# Patient Record
Sex: MALE | Race: WHITE | NOT HISPANIC OR LATINO | Employment: OTHER | URBAN - METROPOLITAN AREA
[De-identification: names, ages, dates, MRNs, and addresses within clinical notes are randomized per-mention and may not be internally consistent; named-entity substitution may affect disease eponyms.]

---

## 2024-11-25 ENCOUNTER — TELEPHONE (OUTPATIENT)
Dept: ORTHOPEDICS | Facility: CLINIC | Age: 59
End: 2024-11-25
Payer: COMMERCIAL

## 2024-11-25 NOTE — TELEPHONE ENCOUNTER
----- Message from Karina sent at 11/6/2024  3:49 PM CST -----  Contact: Loulou/wife  Type:  Sooner Apoointment Request    Caller is requesting a sooner appointment.  Caller declined first available appointment listed below.  Caller will not accept being placed on the waitlist and is requesting a message be sent to doctor.  Name of Caller:Loulou(wife)  When is the first available appointment? December/11th/2024 for [facility] and December/13th/2024 for the [facility]  Symptoms: left shoulder pain  Would the patient rather a call back or a response via [facility]? Call back  Best Call Back Number:[phone]    Additional Information[place] [facility] [place]

## 2025-03-19 ENCOUNTER — OFFICE VISIT (OUTPATIENT)
Dept: ORTHOPEDICS | Facility: CLINIC | Age: 60
End: 2025-03-19
Payer: COMMERCIAL

## 2025-03-19 VITALS — BODY MASS INDEX: 33.75 KG/M2 | HEIGHT: 66 IN | WEIGHT: 210 LBS

## 2025-03-19 DIAGNOSIS — M19.012 PRIMARY OSTEOARTHRITIS OF LEFT SHOULDER: ICD-10-CM

## 2025-03-19 DIAGNOSIS — M17.12 OSTEOARTHRITIS OF LEFT KNEE, UNSPECIFIED OSTEOARTHRITIS TYPE: Primary | ICD-10-CM

## 2025-03-19 DIAGNOSIS — M25.519 SHOULDER PAIN, UNSPECIFIED CHRONICITY, UNSPECIFIED LATERALITY: Primary | ICD-10-CM

## 2025-03-19 DIAGNOSIS — M17.12 PRIMARY OSTEOARTHRITIS OF LEFT KNEE: Primary | ICD-10-CM

## 2025-03-19 PROCEDURE — G2211 COMPLEX E/M VISIT ADD ON: HCPCS | Mod: S$GLB,,, | Performed by: ORTHOPAEDIC SURGERY

## 2025-03-19 PROCEDURE — 1160F RVW MEDS BY RX/DR IN RCRD: CPT | Mod: CPTII,S$GLB,, | Performed by: ORTHOPAEDIC SURGERY

## 2025-03-19 PROCEDURE — 3008F BODY MASS INDEX DOCD: CPT | Mod: CPTII,S$GLB,, | Performed by: ORTHOPAEDIC SURGERY

## 2025-03-19 PROCEDURE — 99999 PR PBB SHADOW E&M-EST. PATIENT-LVL III: CPT | Mod: PBBFAC,,, | Performed by: ORTHOPAEDIC SURGERY

## 2025-03-19 PROCEDURE — 99213 OFFICE O/P EST LOW 20 MIN: CPT | Mod: S$GLB,,, | Performed by: ORTHOPAEDIC SURGERY

## 2025-03-19 PROCEDURE — 1159F MED LIST DOCD IN RCRD: CPT | Mod: CPTII,S$GLB,, | Performed by: ORTHOPAEDIC SURGERY

## 2025-03-19 RX ORDER — AMLODIPINE BESYLATE 2.5 MG/1
2.5 TABLET ORAL
COMMUNITY

## 2025-03-19 RX ORDER — CLONAZEPAM 0.5 MG/1
1 TABLET ORAL NIGHTLY PRN
COMMUNITY
Start: 2025-01-29

## 2025-03-19 NOTE — PATIENT INSTRUCTIONS
Encounter Diagnoses   Name Primary?    Primary osteoarthritis of left knee Yes    Primary osteoarthritis of left shoulder        ASSESSMENT:  Left shoulder with 10/10 pain nonresponsive to physical therapy November and December 20, 2024.  No significant improvement with steroidal injection.  Left knee with a improvement status post Euflexxa October 9, 2024.  No previous left shoulder surgeries.  Status post left ACL reconstruction 40+ years ago    TREATMENT/PLAN:  Proceed to MRI for the left shoulder.  He has not responded to conservative treatment including physical therapy.  He has constant pain day and night.  Rule out rotator cuff tear labral tear and evaluate extent of osteoarthritis of the left shoulder joint.  Return to office after MRI for further evaluation and consultation regarding treatment options for left shoulder chronic pain.  Order viscosupplementation 3 injection series for the left knee to begin after April 9, 2025.  The patient got good pain reduction and increased activity of daily living in October after conclusion of Euflexxa Visco supplement injection series.

## 2025-03-19 NOTE — PROGRESS NOTES
Emre Purdy MD  Orthopedic Surgeon   [address] [place] [address]                 Name: Terrance Johnson  MRN: [id number]  Date: 3/19/2025    Patient ID: Terrance Johnson is a 60 y.o. male from  [facility]    Reason for visit:  Pain 10/10, left shoulder  Follow up left knee arthritis moderate pain    Patient Instructions     Encounter Diagnoses   Name Primary?    Primary osteoarthritis of left knee Yes    Primary osteoarthritis of left shoulder        ASSESSMENT:  Left shoulder with 10/10 pain nonresponsive to physical therapy November and December 20, 2024.  No significant improvement with steroidal injection.  Left knee with a improvement status post Euflexxa October 9, 2024.  No previous left shoulder surgeries.  Status post left ACL reconstruction 40+ years ago    TREATMENT/PLAN:  Proceed to MRI for the left shoulder.  He has not responded to conservative treatment including physical therapy.  He has constant pain day and night.  Rule out rotator cuff tear labral tear and evaluate extent of osteoarthritis of the left shoulder joint.  Return to office after MRI for further evaluation and consultation regarding treatment options for left shoulder chronic pain.  Order viscosupplementation 3 injection series for the left knee to begin after April 9, 2025.  The patient got good pain reduction and increased activity of daily living in October after conclusion of Euflexxa Visco supplement injection series.    HISTORY OF PRESENT ILLNESS:   Terrance Johnson is a 60 y.o. male.Patient presents today with high level pain of the left shoulder 10/10  He got no relief with physical therapy that he participated in November and December of 2024.  Intra-articular injection the shoulder performed by me October 9, 2024  resulted in no significant pain improvement of the left shoulder.    Left knee got better after Euflexxa injection series completed October 9, 2024.    He had a left ACL reconstruction 40+ years ago by Dr. Baker.    He has never had left shoulder surgery.  He states he worked as a  for many years carried heavy objects and has worn his shoulder out.  Left shoulder pain is with overhead activities lifting and carrying he has constant popping in the shoulder.  She states the pain in the shoulders constant day and night.  Having difficulty sleeping.    Objective     XRAY:  None today      PHYSICAL EXAMINATION: Body mass index is 33.89 kg/m².    GENERAL:  Pleasant cooperative alert  gentleman in no acute distress.  Well dressed and well groomed.    EXTREMITY:  Left shoulder has positive popping with circumduction at about 30° abduction.  AC joint nontender.  Pain with resisted abduction with weakness on abduction.  Internal rotation is diminished active and passive range of motion left shoulder.  Neck supple free range of motion with no Spurling's positivity    Left knee has well-healed anteromedial incision from ACL reconstruction.  Anterior drawer has a good firm endpoint +1- 2 mm  No significant effusion of the left knee joint.  Positive pain at the medial joint line.  Pain is increased with flexion rotation and varus stress along the medial joint line.  Left knee is without significant popping or crepitation.  Quadriceps strength 5/5 left leg.  Skin temperature tone normal left lower extremity.  No peripheral edema.  Distal pulses and cap refill normal in the left leg         MEDICATIONS: Current Medications[1]    ALLERGIES: Review of patient's allergies indicates:  No Known Allergies    MEDICAL HISTORY:   Past Medical History:   Diagnosis Date    Hypertension        SURGICAL HISTORY:   Past Surgical History:   Procedure Laterality Date    acl Left     TONSILLECTOMY         REVIEW OF  SYSTEMS:   No fevers, chills, sweats, chest pain or shortness of breath.    SOCIAL HISTORY:   Social History     Occupational History    Not on file   Tobacco Use    Smoking status: Never    Smokeless tobacco: Never   Substance and Sexual Activity    Alcohol use: Not Currently    Drug use: Not on file    Sexual activity: Not on file       Patient Type: Established Patient  Visit Type: (CPT 77180 - Estab 20-29 min)     25 minute patient encounter  This includes face to face time and non-face to face time preparing to see the patient (eg, review of tests),   obtaining and/or reviewing separately obtained history, documenting clinical information in the electronic or other health record, independently interpreting results   and communicating results to the patient/family/caregiver, or care coordinator.       This patient has a chronic osteoarthritis diagnosis/condition.  Today's visit is associated with current or anticipated ongoing medical care related to this patient's diagnosis of osteoarthritis.   Currently there is no cure of osteoarthritis and the patient will require regular follow up to manage symptoms and progression.  I, Emre Purdy M.D., will be the primary medical provider for ongoing episodic treatment every 3-6 months as needed.  As such, CPT code  is the appropriate add-on code to accompany the other E/M billing for this visit.       DISCLAIMER: This note was prepared with [employer] voice recognition transcription software. Garbled syntax, mangled pronouns, and other bizarre constructions may be attributed to that software system.      Emre Purdy M.D.  Orthopedic Surgeon  [facility]           [1]   Current Outpatient Medications:     clonazePAM (KLONOPIN) 0.5 MG tablet, Take 1 tablet by mouth nightly as needed., Disp: , Rfl:     amLODIPine (NORVASC) 2.5 MG tablet, Take 2.5 mg by mouth., Disp: , Rfl:

## 2025-03-20 ENCOUNTER — TELEPHONE (OUTPATIENT)
Dept: ORTHOPEDICS | Facility: CLINIC | Age: 60
End: 2025-03-20
Payer: COMMERCIAL

## 2025-03-20 NOTE — TELEPHONE ENCOUNTER
Spoke with patient in regard to getting appt to discuss Mri at the [place] location    ----- Message from Stefania sent at 3/20/2025  9:19 AM CDT -----  Contact: pt wife  Type:  Needs Medical AdviceWho Called: Loulou Would the patient rather a call back or a response via [facility]? Call Best Call Back Number:  [phone] Additional Information:  Loulou states the pt has his MRI scheduled for next Tuesday 3/25.

## 2025-03-25 ENCOUNTER — HOSPITAL ENCOUNTER (OUTPATIENT)
Dept: RADIOLOGY | Facility: HOSPITAL | Age: 60
Discharge: HOME | End: 2025-03-25
Attending: ORTHOPAEDIC SURGERY
Payer: COMMERCIAL

## 2025-03-25 DIAGNOSIS — M25.519 SHOULDER PAIN, UNSPECIFIED CHRONICITY, UNSPECIFIED LATERALITY: ICD-10-CM

## 2025-03-25 PROCEDURE — 73221 MRI JOINT UPR EXTREM W/O DYE: CPT | Mod: 26,LT,, | Performed by: RADIOLOGY

## 2025-03-25 PROCEDURE — 73221 MRI JOINT UPR EXTREM W/O DYE: CPT | Mod: TC,PN,LT

## 2025-03-28 ENCOUNTER — OFFICE VISIT (OUTPATIENT)
Dept: ORTHOPEDICS | Facility: CLINIC | Age: 60
End: 2025-03-28
Payer: COMMERCIAL

## 2025-03-28 VITALS — HEIGHT: 66 IN | BODY MASS INDEX: 33.77 KG/M2 | WEIGHT: 210.13 LBS

## 2025-03-28 DIAGNOSIS — M24.112 LABRAL TEAR OF SHOULDER, DEGENERATIVE, LEFT: Primary | ICD-10-CM

## 2025-03-28 DIAGNOSIS — M19.012 ARTHRITIS OF LEFT ACROMIOCLAVICULAR JOINT: ICD-10-CM

## 2025-03-28 DIAGNOSIS — M25.819 SHOULDER IMPINGEMENT: ICD-10-CM

## 2025-03-28 DIAGNOSIS — M19.012 PRIMARY OSTEOARTHRITIS OF LEFT SHOULDER: ICD-10-CM

## 2025-03-28 DIAGNOSIS — M75.82 ROTATOR CUFF TENDONITIS, LEFT: ICD-10-CM

## 2025-03-28 DIAGNOSIS — M25.519 SHOULDER PAIN, UNSPECIFIED CHRONICITY, UNSPECIFIED LATERALITY: ICD-10-CM

## 2025-03-28 PROCEDURE — 99999 PR PBB SHADOW E&M-EST. PATIENT-LVL III: CPT | Mod: PBBFAC,,, | Performed by: ORTHOPAEDIC SURGERY

## 2025-03-28 NOTE — PATIENT INSTRUCTIONS
Encounter Diagnoses   Name Primary?    Shoulder pain, unspecified chronicity, unspecified laterality Yes    Primary osteoarthritis of left shoulder     Labral tear of shoulder, degenerative, left     Arthritis of left acromioclavicular joint     Rotator cuff tendonitis, left     Shoulder impingement        ASSESSMENT:  Impingement syndrome of the left shoulder not responding to conservative treatment including home exercise protocol anti-inflammatories and activity modification.  MRI demonstrates multiple abnormalities within the shoulder inclusive of OA of the glenohumeral joint, AC joint and degenerative labral tear  Supraspinatus and infraspinatus tendinosis with no rotator cuff tear    TREATMENT/PLAN:  The patient is having high level pain with decreased functional activities.  He is a candidate for consideration of arthroscopic shoulder subacromial decompression etc, and labral debridement.  I recommend he see Dr. Vaughn English for surgical consultation

## 2025-03-28 NOTE — PROGRESS NOTES
Emre Purdy MD  Orthopedic Surgeon   [address] [place] [address]                 Name: Terrance Johnson  MRN: [id number]  Date: 3/28/2025    Patient ID: Terrance Johnson is a 60 y.o. male from [facility]    Reason for visit:  Follow up left shoulder post MRI    Patient Instructions     Encounter Diagnoses   Name Primary?    Shoulder pain, unspecified chronicity, unspecified laterality Yes    Primary osteoarthritis of left shoulder     Labral tear of shoulder, degenerative, left     Arthritis of left acromioclavicular joint     Rotator cuff tendonitis, left     Shoulder impingement        ASSESSMENT:  Impingement syndrome of the left shoulder not responding to conservative treatment including home exercise protocol anti-inflammatories and activity modification.  MRI demonstrates multiple abnormalities within the shoulder inclusive of OA of the glenohumeral joint, AC joint and degenerative labral tear  Supraspinatus and infraspinatus tendinosis with no rotator cuff tear    TREATMENT/PLAN:  The patient is having high level pain with decreased functional activities.  He is a candidate for consideration of arthroscopic shoulder subacromial decompression etc, and labral debridement.  I recommend he see Dr. Vaughn English for surgical consultation     HISTORY OF PRESENT ILLNESS:   Terrance Johnson is a 60 y.o. male.Patient presents today for left shoulder chronic pain 7/10  MRI performed 03/25/2025 at [facility]  Supraspinatus and infraspinatus tendinosis with minimal intrasubstance delamination.     Severe osteoarthritis AC joint.     Severe glenohumeral osteoarthritis with circumferential degenerative tearing of the labrum.     Small joint effusion with synovitis and/or intra-articular loose bodies.      Finalized on: 3/25/2025 3:10 PM By:  Fede Aranda MD    Objective       PHYSICAL EXAMINATION: Body mass index is 33.91 kg/m².    GENERAL:  Pleasant cooperative alert  male in no acute distress well dressed and well groomed    EXTREMITY:  The patient has positive abduction to 60° and forward flexion to 95°.  Assistive abduction and forward flexion can be 120° with his other arm.  Internal rotation limited on the left to iliac crest.  Pain with impingement maneuver.  Negative speed's test.  Giving way to pain on resisted abduction and forward flexion.  Neck supple free range of motion with negative Spurling's maneuver.  Normal pulses and cap refill of the left upper extremity.  Normal elbow range of motion normal wrist and hand range of motion.  Skin temperature tone is normal in the left upper extremity.         MEDICATIONS: Current Medications[1]    ALLERGIES: Review of patient's allergies indicates:  No Known Allergies    MEDICAL HISTORY:   Past Medical History:   Diagnosis Date    Hypertension        SURGICAL HISTORY:   Past Surgical History:   Procedure Laterality Date    acl Left     TONSILLECTOMY         REVIEW OF SYSTEMS:   No fevers, chills, sweats, chest pain or shortness of breath.    SOCIAL HISTORY:   Social History     Occupational History    Not on file   Tobacco Use    Smoking status: Never    Smokeless tobacco: Never   Substance and Sexual Activity    Alcohol use: Not Currently    Drug use: Not on file    Sexual activity: Not on file       Patient Type: Established Patient  Visit Type: (CPT 70624 - Estab 20-29 min)     Twenty minute patient encounter  This includes face to face time and non-face to face time preparing to see the patient (eg, review of tests),   obtaining and/or reviewing separately obtained history, documenting clinical information in the electronic or other health record, independently interpreting results   and communicating results to the patient/family/caregiver,  or care coordinator.       DISCLAIMER: This note was prepared with [employer] voice recognition transcription software. Garbled syntax, mangled pronouns, and other bizarre constructions may be attributed to that software system.      Emre Purdy M.D.  Orthopedic Surgeon  [facility]           [1]   Current Outpatient Medications:     amLODIPine (NORVASC) 2.5 MG tablet, Take 2.5 mg by mouth., Disp: , Rfl:     clonazePAM (KLONOPIN) 0.5 MG tablet, Take 1 tablet by mouth nightly as needed., Disp: , Rfl:

## 2025-04-01 ENCOUNTER — OFFICE VISIT (OUTPATIENT)
Dept: SPORTS MEDICINE | Facility: CLINIC | Age: 60
End: 2025-04-01
Payer: COMMERCIAL

## 2025-04-01 ENCOUNTER — HOSPITAL ENCOUNTER (OUTPATIENT)
Dept: CARDIOLOGY | Facility: HOSPITAL | Age: 60
Discharge: HOME | End: 2025-04-01
Attending: STUDENT IN AN ORGANIZED HEALTH CARE EDUCATION/TRAINING PROGRAM
Payer: COMMERCIAL

## 2025-04-01 ENCOUNTER — TELEPHONE (OUTPATIENT)
Dept: SPORTS MEDICINE | Facility: CLINIC | Age: 60
End: 2025-04-01

## 2025-04-01 DIAGNOSIS — Z01.818 PREOPERATIVE CLEARANCE: ICD-10-CM

## 2025-04-01 DIAGNOSIS — M19.012 GLENOHUMERAL ARTHRITIS, LEFT: Primary | ICD-10-CM

## 2025-04-01 LAB
OHS QRS DURATION: 80 MS
OHS QTC CALCULATION: 402 MS

## 2025-04-01 PROCEDURE — 93010 ELECTROCARDIOGRAM REPORT: CPT | Mod: ,,, | Performed by: INTERNAL MEDICINE

## 2025-04-01 PROCEDURE — 93005 ELECTROCARDIOGRAM TRACING: CPT

## 2025-04-01 PROCEDURE — 99999 PR PBB SHADOW E&M-EST. PATIENT-LVL IV: CPT | Mod: PBBFAC,,, | Performed by: STUDENT IN AN ORGANIZED HEALTH CARE EDUCATION/TRAINING PROGRAM

## 2025-04-01 NOTE — H&P (VIEW-ONLY)
Orthopaedics Sports Medicine     Shoulder Initial Visit         4/1/2025    Referring MD: Emre Purdy MD    Chief Complaint   Patient presents with    Left Shoulder - Pain         History of Present Illness:   Terrance Johnson is a 60 y.o. right-hand dominant male who presents with left shoulder pain and dysfunction.    Onset of the symptoms was approximately 10 years ago.       Inciting event: No specific injury or trauma, gradual onset and worsening of symptoms.      Current symptoms include left shoulder pain mostly localized to the anterior and lateral shoulder. He reports constant aching, sharp, and shooting pain. He rates his pain a 7/10 today. The patients reports difficulty with certain movements like external rotation and lifting his arm out to the side. He reports he is very active with work and his pain limits his function. He reports it has started to affect him at night and reports difficulty with sleep due to the pain.  He reports crepitus with range of motion. He denies any shoulder instability, neck pain, or radicular symptoms.     Pain is aggravated by overhead movement.       Evaluation to date: X-Ray, MRI     Treatment to date: He has been treated by external providers but reports he has tried rest, activity modification, Tylenol, anti-inflammatories, multiple corticosteroid injections, and physical therapy.      Past Medical History:   Past Medical History:   Diagnosis Date    Hypertension        Past Surgical History:   Past Surgical History:   Procedure Laterality Date    acl Left     TONSILLECTOMY         Medications:  Patient's Medications   New Prescriptions    No medications on file   Previous Medications    AMLODIPINE (NORVASC) 2.5 MG TABLET    Take 2.5 mg by mouth.    CLONAZEPAM (KLONOPIN) 0.5 MG TABLET    Take 1 tablet by mouth nightly as needed.   Modified Medications    No medications on file   Discontinued Medications    No medications on file       Allergies: Review of patient's  "allergies indicates:  No Known Allergies    Social History:   Home town: [place], [place]  Occupation: Business Owner  Alcohol use: He reports that he does not currently use alcohol.  Tobacco use: He reports that he has never smoked. He has never used smokeless tobacco.    Review of systems:  History of recent illness, fevers, shakes, or chills: no  History of cardiac problems or chest pain: no  History of pulmonary problems or asthma: no  History of diabetes: no  History of prior dvt or clotting problems: no  History of sleep apnea: no      Physical Examination:  Estimated body mass index is 33.91 kg/m² as calculated from the following:    Height as of 3/28/25: 5' 6" (1.676 m).    Weight as of 3/28/25: 95.3 kg (210 lb 1.6 oz).    General  Healthy appearing male in no acute distress  Alert and oriented, normal mood, appropriate affect    Shoulder Examination:  Patient is alert and oriented, no distress. Skin is intact. Neuro is normal with no focal motor or sensory findings.    Cervical exam is unremarkable. Intact cervical ROM. Negative Spurling's test    Physical Exam:  RIGHT    LEFT    Scap Dyskinesis/Winging (-)    (-)  Scapular hike       +    Tenderness:          Greater Tuberosity             (-)    (-)  Bicipital Groove  (-)    (-)  AC joint   (-)    (-)  Other:     ROM:  Forward Elevation 180    130/140  Abduction  120    90  ER (at side)  80    0  IR   T8    Back pocket    Strength:   Supraspinatus  5/5    4/5  Infraspinatus  5/5    4/5  Subscap / IR  5/5    5/5     Special Tests:   Neer:    (-)    +   Wiley:   (-)    +   SS Stress:   (-)    +   Bear Hug:   (-)    (-)   Hartford's:   (-)    +   Resisted Thrower's:   (-)    +   Speed's   (-)    (-)   Cross Arm Abduction:  (-)    (-)    Neurovascular examination  - Motor grossly intact bilaterally to shoulder abduction, elbow flexion and extension, wrist flexion and extension, and intrinsic hand musculature  - Sensation intact to light touch bilaterally in " axillary, median, radial, and ulnar distributions  - Symmetrical radial pulses      Imaging:  XR Results:  No results found for this or any previous visit.      MRI Results:  Results for orders placed during the hospital encounter of 03/25/25    MRI Shoulder Without Contrast Left    Narrative  EXAM: MRI SHOULDER WITHOUT CONTRAST LEFT    CLINICAL HISTORY:  Chronic left shoulder pain.    COMPARISON: None.    TECHNIQUE: Standard multiplanar pulse sequences obtained without IV or intra-articular contrast.    FINDINGS:    Mild supraspinatus tendinosis and moderate infraspinatus tendinosis with minimal intrasubstance delamination.  Mild subscapularis tendinosis.  Teres minor tendons are normal.  Normal rotator cuff muscle bulk.    Type II acromion with lateral downsloping.  Severe AC joint osteoarthritis with capsular and undersurface osteophytes.  Trace fluid subacromial/subdeltoid bursa.    Tendinosis intracapsular segment of biceps tendon.  Biceps labral complex intact.  Circumferential degenerative tearing of the labrum.  Severe glenohumeral osteoarthritis with a bone-on-bone appearance of the joint space.  Punctate subchondral cysts, reactive marrow edema and large inferior osteophytes.    Small joint effusion with debris within the inferior joint likely representing focal synovitis and punctate intra-articular loose bodies.  14 mm hypointense intra-articular loose body within the subcoracoid recess.    Impression  Supraspinatus and infraspinatus tendinosis with minimal intrasubstance delamination.    Severe osteoarthritis AC joint.    Severe glenohumeral osteoarthritis with circumferential degenerative tearing of the labrum.    Small joint effusion with synovitis and/or intra-articular loose bodies.    Finalized on: 3/25/2025 3:10 PM By:  Fede Aranda MD  [facility]# [id number]      2025-03-25 15:13:[id number]     [facility]      CT Results:  No results found for this or any previous visit.      Physician Read: I agree with  the above impression.      Impression:  60 y.o. male with left shoulder glenohumeral arthritis         Plan:  Discussed diagnosis and treatment options with patient today. He has left shoulder glenohumeral arthritis. His MRI shows that his rotator cuff is grossly intact.   Discussed non-operative treatment options in the form of rest, activity modifications, oral anti-inflammatories, corticosteroid injections, and physical therapy/physician directed home exercise program versus potential operative treatment in the form of total shoulder arthoplasty.   The patient has tried considerable conservative treatment in the form of rest, activity modifications, oral anti-inflammatories, physical therapy, and multiple corticosteroid injections. Despite these interventions, he continues to experience symptoms on a daily basis that limit his activities of daily living and diminish his quality of life. He is ready to move forward with operative treatment at this time.   I recommend proceeding with left total shoulder arthoplasty.    We reviewed the proposed procedure in detail, which included discussion of risks and benefits, techniques, and possible complications of the procedure. Risks include infection, bleeding, damage to artery and nerves, continual pain and possible stiffness, and blood clots. We reviewed the post-operative restrictions, recovery period, and rehabilitation.  All patient questions were answered. Despite the risks, he elected to proceed with surgery and the consent was freely signed.  At least 10 minutes were spent instructing the patient in home care following surgery including, but not limited to: sling use, sleeping, hygiene, post-operative exercises, preventing post-operative complications, etc.  All questions were answered.  This service was performed under the direction of Jeremías English MD.  CPT 84401-IQ.  Follow up with me 10-14 days after surgery           Jeremías English,  MD MONTEIRO, Max Romero, acted as a scribe for Jeremías English MD for the duration of this office visit.

## 2025-04-01 NOTE — PROGRESS NOTES
Orthopaedics Sports Medicine     Shoulder Initial Visit         4/1/2025    Referring MD: Emre Purdy MD    Chief Complaint   Patient presents with    Left Shoulder - Pain         History of Present Illness:   Terrance Johnson is a 60 y.o. right-hand dominant male who presents with left shoulder pain and dysfunction.    Onset of the symptoms was approximately 10 years ago.       Inciting event: No specific injury or trauma, gradual onset and worsening of symptoms.      Current symptoms include left shoulder pain mostly localized to the anterior and lateral shoulder. He reports constant aching, sharp, and shooting pain. He rates his pain a 7/10 today. The patients reports difficulty with certain movements like external rotation and lifting his arm out to the side. He reports he is very active with work and his pain limits his function. He reports it has started to affect him at night and reports difficulty with sleep due to the pain.  He reports crepitus with range of motion. He denies any shoulder instability, neck pain, or radicular symptoms.     Pain is aggravated by overhead movement.       Evaluation to date: X-Ray, MRI     Treatment to date: He has been treated by external providers but reports he has tried rest, activity modification, Tylenol, anti-inflammatories, multiple corticosteroid injections, and physical therapy.      Past Medical History:   Past Medical History:   Diagnosis Date    Hypertension        Past Surgical History:   Past Surgical History:   Procedure Laterality Date    acl Left     TONSILLECTOMY         Medications:  Patient's Medications   New Prescriptions    No medications on file   Previous Medications    AMLODIPINE (NORVASC) 2.5 MG TABLET    Take 2.5 mg by mouth.    CLONAZEPAM (KLONOPIN) 0.5 MG TABLET    Take 1 tablet by mouth nightly as needed.   Modified Medications    No medications on file   Discontinued Medications    No medications on file       Allergies: Review of patient's  "allergies indicates:  No Known Allergies    Social History:   Home town: [place], [place]  Occupation: Business Owner  Alcohol use: He reports that he does not currently use alcohol.  Tobacco use: He reports that he has never smoked. He has never used smokeless tobacco.    Review of systems:  History of recent illness, fevers, shakes, or chills: no  History of cardiac problems or chest pain: no  History of pulmonary problems or asthma: no  History of diabetes: no  History of prior dvt or clotting problems: no  History of sleep apnea: no      Physical Examination:  Estimated body mass index is 33.91 kg/m² as calculated from the following:    Height as of 3/28/25: 5' 6" (1.676 m).    Weight as of 3/28/25: 95.3 kg (210 lb 1.6 oz).    General  Healthy appearing male in no acute distress  Alert and oriented, normal mood, appropriate affect    Shoulder Examination:  Patient is alert and oriented, no distress. Skin is intact. Neuro is normal with no focal motor or sensory findings.    Cervical exam is unremarkable. Intact cervical ROM. Negative Spurling's test    Physical Exam:  RIGHT    LEFT    Scap Dyskinesis/Winging (-)    (-)  Scapular hike       +    Tenderness:          Greater Tuberosity             (-)    (-)  Bicipital Groove  (-)    (-)  AC joint   (-)    (-)  Other:     ROM:  Forward Elevation 180    130/140  Abduction  120    90  ER (at side)  80    0  IR   T8    Back pocket    Strength:   Supraspinatus  5/5    4/5  Infraspinatus  5/5    4/5  Subscap / IR  5/5    5/5     Special Tests:   Neer:    (-)    +   Wiley:   (-)    +   SS Stress:   (-)    +   Bear Hug:   (-)    (-)   [place]'s:   (-)    +   Resisted Thrower's:   (-)    +   Speed's   (-)    (-)   Cross Arm Abduction:  (-)    (-)    Neurovascular examination  - Motor grossly intact bilaterally to shoulder abduction, elbow flexion and extension, wrist flexion and extension, and intrinsic hand musculature  - Sensation intact to light touch bilaterally in " axillary, median, radial, and ulnar distributions  - Symmetrical radial pulses      Imaging:  XR Results:  No results found for this or any previous visit.      MRI Results:  Results for orders placed during the hospital encounter of 03/25/25    MRI Shoulder Without Contrast Left    Narrative  EXAM: MRI SHOULDER WITHOUT CONTRAST LEFT    CLINICAL HISTORY:  Chronic left shoulder pain.    COMPARISON: None.    TECHNIQUE: Standard multiplanar pulse sequences obtained without IV or intra-articular contrast.    FINDINGS:    Mild supraspinatus tendinosis and moderate infraspinatus tendinosis with minimal intrasubstance delamination.  Mild subscapularis tendinosis.  Teres minor tendons are normal.  Normal rotator cuff muscle bulk.    Type II acromion with lateral downsloping.  Severe AC joint osteoarthritis with capsular and undersurface osteophytes.  Trace fluid subacromial/subdeltoid bursa.    Tendinosis intracapsular segment of biceps tendon.  Biceps labral complex intact.  Circumferential degenerative tearing of the labrum.  Severe glenohumeral osteoarthritis with a bone-on-bone appearance of the joint space.  Punctate subchondral cysts, reactive marrow edema and large inferior osteophytes.    Small joint effusion with debris within the inferior joint likely representing focal synovitis and punctate intra-articular loose bodies.  14 mm hypointense intra-articular loose body within the subcoracoid recess.    Impression  Supraspinatus and infraspinatus tendinosis with minimal intrasubstance delamination.    Severe osteoarthritis AC joint.    Severe glenohumeral osteoarthritis with circumferential degenerative tearing of the labrum.    Small joint effusion with synovitis and/or intra-articular loose bodies.    Finalized on: 3/25/2025 3:10 PM By:  Fede Aranda MD  [facility]# [id number]      2025-03-25 15:13:[id number]     [facility]      CT Results:  No results found for this or any previous visit.      Physician Read: I agree with  the above impression.      Impression:  60 y.o. male with left shoulder glenohumeral arthritis         Plan:  Discussed diagnosis and treatment options with patient today. He has left shoulder glenohumeral arthritis. His MRI shows that his rotator cuff is grossly intact.   Discussed non-operative treatment options in the form of rest, activity modifications, oral anti-inflammatories, corticosteroid injections, and physical therapy/physician directed home exercise program versus potential operative treatment in the form of total shoulder arthoplasty.   The patient has tried considerable conservative treatment in the form of rest, activity modifications, oral anti-inflammatories, physical therapy, and multiple corticosteroid injections. Despite these interventions, he continues to experience symptoms on a daily basis that limit his activities of daily living and diminish his quality of life. He is ready to move forward with operative treatment at this time.   I recommend proceeding with left total shoulder arthoplasty.    We reviewed the proposed procedure in detail, which included discussion of risks and benefits, techniques, and possible complications of the procedure. Risks include infection, bleeding, damage to artery and nerves, continual pain and possible stiffness, and blood clots. We reviewed the post-operative restrictions, recovery period, and rehabilitation.  All patient questions were answered. Despite the risks, he elected to proceed with surgery and the consent was freely signed.  At least 10 minutes were spent instructing the patient in home care following surgery including, but not limited to: sling use, sleeping, hygiene, post-operative exercises, preventing post-operative complications, etc.  All questions were answered.  This service was performed under the direction of Jeremías English MD.  CPT 34693-PS.  Follow up with me 10-14 days after surgery           Jeremías English,  MD MONTEIRO, Max Romero, acted as a scribe for Jeremías English MD for the duration of this office visit.

## 2025-04-01 NOTE — TELEPHONE ENCOUNTER
Post-op PT referral and rehab protocol successfully faxed to GIULIANO Arechiga at [phone] on 4/1/25. [facility]

## 2025-04-01 NOTE — PATIENT INSTRUCTIONS
In preparation for you upcoming surgery, here are some things to keep in mind leading up to and after your surgery:    PRE-ADMIT APPOINTMENT  We have a department that will review your chart for any health conditions or other issues to make sure that it is safe from an anesthesia standpoint to undergo surgery.   If they have any concerns they may schedule an appointment for you to be evaluated and have any further testing done. This may include but is not limited to bloodwork, EKG, chest X-ray, referral to cardiologist for additional testing/clearance, referral to pulmonologist for additional testing/clearance, or referral to any other needed specialties for additional testing/clearance.  If only basic testing is needed this appointment may be scheduled a few days before your actually surgery. Unless any new concerns or issues arise, this typically does not affect the date of your surgery.   If you are on any medications, at this appointment they will also review and discuss/provide instructions on when to stop or start taking these medications before and after surgery.   INSTRUCTIONS FOR SURGERY   The day before your surgery (usually between 1-3 PM), someone will call you to give you the scheduled time for you surgery, what time you need to arrive, what time to not eat/drink past, and any other final instructions  If your surgery is scheduled for Monday then they will call you on Friday afternoon.   If you do not receive this call please reach out to our office before the end of the day (4 PM) so that we may assist you.   HOME EXERCISES AFTER SURGERY        PHYSICAL THERAPY  A referral for physical therapy will be placed to the location we discussed today. If you would like to make changes to this, please give us a call or send us a [employer] message as soon as possible so we may coordinate these changes.   We will send that referral to the desired location and also provide them with the rehabilitation protocol that  will be followed to make sure you are progressed appropriately after surgery.   They will also be provided with the start date of your PT after surgery. You will likely start PT prior to you first post-op appointment. Depending on the procedure you have performed this may be as soon as 3 days after surgery or up to 2 weeks after surgery. Below are a few examples of some common time frames for certain procedures:  Rotator cuff surgery- 10-11 days after surgery  Shoulder labrum surgery- 3-5 days after surgery   Shoulder replacement- 3-5 days after surgery  ACL Reconstruction- 3-5 days after surgery  Hip scope- 3-5 days after surgery  Distal biceps tendon repair- once post-op splint is removed/per Dr. English recommendation  Fracture- once post-op splint is removed/per Dr. English recommendation   If you ever have any problems or issues with your physical therapy or wish to change locations at any point, please let us know and we are happy to assist with that change.   POST-OP APPOINTMENTS  Post-op appointments will be scheduled at 2 weeks, 6 weeks, and 3 months from the date of your surgery. We will schedule these appointments prior to your surgery and they will be able to be viewed in [employer].  2 week post-op appointment  This appointment will be with Josefina Mckeon who is Dr. English's physician assistant (PA). At this appointment we will be removing your sutures, checking for any signs of infection or other concerning issues, and checking to make sure your range of motion is appropriate.   Dr. English will also be in clinic on the same day as this appointment and can step in to see you if there is anything of concern that needs to be addressed.   You may also have X-rays scheduled at this appointment, depending on the procedure you had performed (shoulder replacement, ACL surgery, surgery for a fracture, etc.)  6 week post-op appointment  This appointment will be with Dr. English. He will make sure  everything is progressing well and may also review your pictures from surgery if any were taken.   You may also have X-rays at this appointment, depending on the procedure you had performed (shoulder replacement, surgery for a fracture, etc.)  3 month post-op appointment  This appointment will be with Dr. English. He will make sure you continue to progress appropriately.  Any follow-ups after this visit will be at the discretion of Dr. English based upon your recovery/progress, procedure performed, etc.   FMLA OR SHORT TERM DISABILITY PAPERWORK  If you have any paperwork that needs to be filled out in regards to FMLA leave or short term disability leave, you may drop these forms off at the [place] or attachment them to a patient message via [employer].   These forms will be filled out within a few days of your actual surgery being performed in case your surgery date is rescheduled or changed and the forms would need to potentially be filled out again.   Please try to provide these forms to our office in a timely manner, as we ask for 5-7 business days for them to be completed.         Shoulder Joint Replacement    This information does not include all information related to shoulder instablity. For more information please visit the [employer] website using the following link: Shoulder Replacement    Although shoulder joint replacement is less common than knee or hip replacement, it is just as successful in relieving joint pain. Shoulder replacement surgery was first performed in the [place] in the 1950s to treat severe shoulder fractures. Over the years, shoulder joint replacement has come to be used for many other painful conditions of the shoulder, such as different forms of arthritis. Today, about 53,000 people in the [place] have shoulder replacement surgery each year, according to the [employer]. This compares to more than 900,000 Americans a year  who have knee and hip replacement surgery.    If nonsurgical treatments like medications and activity changes are no longer helpful for relieving pain, you may want to consider shoulder joint replacement surgery. Joint replacement surgery is a safe and effective procedure to relieve pain and help you resume everyday activities.    Whether you have just begun exploring treatment options or have already decided to have shoulder joint replacement surgery, this article will help you understand more about this valuable procedure.    ANATOMY  Your shoulder is made up of three bones: your upper arm bone (humerus), your shoulder blade (scapula), and your collarbone (clavicle). The shoulder is a ball-and-socket joint: The ball, or head, of your upper arm bone fits into a shallow socket in your shoulder blade. This socket is called the glenoid.     The surfaces of the bones where they touch are covered with articular cartilage, a smooth substance that protects the bones and enables them to move easily. A thin, smooth tissue called synovial membrane covers all remaining surfaces inside the shoulder joint. In a healthy shoulder, this membrane makes a small amount of fluid that lubricates the cartilage and eliminates almost any friction in your shoulder. The muscles and tendons that surround the shoulder provide stability and support. All of these structures allow the shoulder to rotate through a greater range of motion than any other joint in the body.    DESCRIPTION  In shoulder replacement surgery, the damaged parts of the shoulder are removed and replaced with artificial components, called a prosthesis. The treatment options are either replacement of just the head of the humerus bone (ball), or replacement of both the ball and the socket (glenoid).    CAUSES  Several conditions can cause shoulder pain and disability, and lead patients to consider shoulder joint replacement surgery.    Osteoarthritis (Degenerative Joint  Disease)  Osteoarthritis is an age-related wear-and-tear type of arthritis. It usually occurs in people 50 years of age and older, but may occur in younger people, too. The cartilage that cushions the bones of the shoulder softens and wears away. The bones then rub against one another. Over time, the shoulder joint slowly becomes stiff and painful. Unfortunately, there is no way to prevent the development of osteoarthritis. It is a common reason people have shoulder replacement surgery.    Rheumatoid Arthritis  This is a disease in which the synovial membrane that surrounds the joint becomes inflamed and thickened. This chronic inflammation can damage the cartilage and eventually cause cartilage loss, pain, and stiffness. Rheumatoid arthritis is the most common form of a group of disorders termed inflammatory arthritis.    Post-traumatic Arthritis  This can follow a serious shoulder injury. Fractures of the bones that make up the shoulder or tears of the shoulder tendons or ligaments may damage the articular cartilage over time. This causes shoulder pain and limits shoulder function.    Rotator Cuff Tear Arthropathy  A patient with a very large, long-standing rotator cuff tear may develop cuff tear arthropathy. In this condition, the changes in the shoulder joint due to the rotator cuff tear may lead to arthritis and destruction of the joint cartilage.    Avascular Necrosis (Osteonecrosis)  Avascular necrosis, or osteonecrosis, is a painful condition that occurs when the blood supply to the bone is disrupted. Because bone cells die without a blood supply, osteonecrosis can ultimately cause destruction of the shoulder joint and lead to arthritis. Chronic steroid use, deep sea diving, severe fracture of the shoulder, sickle cell disease, and heavy alcohol use are all risk factors    Severe Fractures  A severe fracture of the shoulder is another common reason people have shoulder replacements. When the head of the  upper arm bone is shattered, it may be very difficult for a doctor to put the pieces of bone back in place. In addition, the blood supply to the bone pieces can be interrupted. In this case, a surgeon may recommend a shoulder replacement. Older patients with osteoporosis are most at risk for severe shoulder fractures.    Failed Previous Shoulder Replacement Surgery  Although uncommon, some shoulder replacements fail, most often because of implant loosening, wear, infection, and dislocation. When this occurs, a second joint replacement surgery -- called a revision surgery -- may be necessary.    IS SHOULDER JOINT REPLACEMENT FOR YOU?  The decision to have shoulder replacement surgery should be a cooperative one between you, your family, your family physician, and your orthopaedic surgeon.There are several reasons why your doctor may recommend shoulder replacement surgery.     People who benefit from surgery often have:  Severe shoulder pain that interferes with everyday activities, such as reaching into a cabinet, dressing, toileting, and washing.  Moderate to severe pain while resting. This pain may be severe enough to prevent a good night's sleep.  Loss of motion and/or weakness in the shoulder.  Failure to substantially improve with other treatments such as anti-inflammatory medications, cortisone injections, and/or physical therapy.    ORTHOPEDIC EVALUATION  Your family physician may refer you to an orthopaedic surgeon for a thorough evaluation to determine if you can benefit from this surgery. An evaluation with an orthopaedic surgeon consists of several components:    A medical history: Your orthopaedic surgeon will gather information about your general health and ask you about the extent of your shoulder pain and your ability to function.  A physical examination: This will assess shoulder motion, stability, and strength.  X-rays: X-rays help to determine the extent of damage in your shoulder. They can show  loss of the normal joint space between bones, flattening or irregularity in the shape of the bone, bone spurs, and loose pieces of cartilage or bone that may be floating inside the joint.  Other tests: Occasionally, your doctor may order blood tests, a computed tomography (CT) scan, a magnetic resonance imaging (MRI) scan, or a bone scan to determine the condition of the bone and soft tissues of your shoulder.        Your orthopaedic surgeon will review the results of your evaluation with you and discuss whether shoulder joint replacement is the best method to relieve your pain and improve your function. Other treatment options -- including medications, injections, physical therapy, or other types of surgery -- will also be discussed and considered.    SHOULDER REPLACEMENT OPTIONS  Shoulder replacement surgery is highly technical. It should be performed by a surgical team with experience in this procedure.    There are different types of shoulder replacements. Your surgeon will evaluate your situation carefully before making any decisions. They will discuss with you which type of replacement will best meet your health needs. Do not hesitate to ask which type of implant will be used in your situation, and why that choice is right for you.    Total Shoulder Replacement  The standard total shoulder replacement involves replacing the arthritic joint surfaces with a highly polished metal ball attached to a stem, and a plastic socket. These components come in various sizes. They may be either cemented or press fit into the bone. If the bone is of good quality, your surgeon may choose to use a non-cemented (press-fit) humeral component. If the bone is soft, the humeral component may be implanted with bone cement. In most cases, an all-plastic glenoid (socket) component is implanted with bone cement.          Implantation of a glenoid component is not advised if:  The glenoid has good cartilage  The glenoid bone is severely  deficient  The rotator cuff tendons are irreparably torn    Patients with bone-on-bone osteoarthritis and intact rotator cuff tendons are generally good candidates for conventional total shoulder replacement.    Hemiarthroplasty  Depending on the condition of your shoulder, your surgeon may replace only the ball. This procedure is called a hemiarthroplasty. In a traditional hemiarthroplasty, the surgeon replaces the head of the humerus with a metal ball and stem, similar to the component used in a total shoulder replacement.     Some surgeons recommend hemiarthroplasty when the humeral head is severely fractured but the socket is normal. Other indications for a hemiarthroplasty include:  Arthritis that involves only the head of the humerus, with a glenoid that has a healthy and intact cartilage surface  Shoulders with severely weakened bone in the glenoid  Some shoulders with severely torn rotator cuff tendons and arthritis    Sometimes, surgeons make the decision between a total shoulder replacement and a hemiarthroplasty in the operating room at the time of the surgery. Studies show that patients with osteoarthritis get better pain relief from total shoulder arthroplasty than from hemiarthroplasty.    Reverse Total Shoulder Replacement  Another type of shoulder replacement is called reverse total shoulder replacement. Reverse total shoulder replacement is used for people who have:  Completely torn rotator cuffs with severe arm weakness  The effects of severe arthritis and rotator cuff tearing (cuff tear arthropathy)  Had a previous shoulder replacement that failed    For these individuals, a conventional total shoulder replacement can still leave them with pain. They may also be unable to lift their arm up past a 90-degree angle. Not being able to lift your arm away from the side can be severely debilitating.    In reverse total shoulder replacement, the socket and metal ball are switched: A metal ball is attached  to the shoulder bone, and a plastic socket is attached to the upper arm bone. This allows the patient to use the deltoid muscle instead of the torn rotator cuff to lift the arm.          PREPARING FOR SURGERY    Medical Evaluation  If you decide to have shoulder replacement surgery, your orthopaedic surgeon may ask you to schedule a complete physical examination with your family physician several weeks before surgery. This is needed to make sure you are healthy enough to have the surgery and complete the recovery process. Many patients with chronic medical conditions, like heart disease, must also be evaluated by a specialist, such as a cardiologist, before the surgery.    Medications  Be sure to talk to your orthopaedic surgeon about the medications you take. Some medications may need to be stopped before surgery. For example, the following over-the-counter medicines may cause excessive bleeding and should be stopped 2 weeks before surgery:  Non-steroidal anti-inflammatory drugs (NSAIDs), such as aspirin, ibuprofen, and naproxen  Most arthritis medications    If you take blood thinners, either your primary care doctor or cardiologist will advise you about stopping these medications before surgery.    Home Planning  Making simple changes in your home before surgery can make your recovery period easier.For the first several weeks after your surgery, it will be hard to reach high shelves and cupboards. Before your surgery, be sure to go through your home and place any items you may need afterwards on low shelves. When you come home from the hospital, you will need help for a few weeks with some daily tasks like dressing, bathing, cooking, and laundry. If you will not have any support at home immediately after surgery, you may need a short stay in a rehabilitation facility until you become more independent.    YOUR SURGERY    Before Your Operation  Wear loose-fitting clothes and a button-front shirt when you go to the  hospital for your surgery. After surgery, you will be wearing a sling and will have limited use of your arm.    Nerve Block  Will receive a nerve block for your surgery to help control your pain after surgery. This cab cause your arm (down to your hand) to feel numb for up to 3 days after surgery. However, it may wear off sooner.      Surgical Procedure  The procedure to replace your shoulder joint with an artificial device usually takes about 2-2.5 hours. After surgery, you will be moved to the recovery room, where you will remain  while your recovery from anesthesia is monitored. Barring any complications you will go home the day of your surgery.       AFTER YOUR SURGERY    Immobilization  When you leave the hospital, your arm will be in a sling. You will need the sling to support and protect your shoulder for the first 2 to 6 weeks after surgery, depending on the complexity of your surgery and your surgeon's preference.    Dressing and Wound Care  Keep the dressing clean and dry. It is normal for there to be some drainage after surgery since the shoulder was irrigated with large amounts of fluid. Reinforce with additional gauze as necessary.  Remove the dressing the 7th day after surgery and begin changing daily with clean gauze or Band-Aids®. Keep your incisions covered until you follow up in clinic.  If you have Steri-Strips in place of stitches, allow them to stay in place as long as possible. Steri-Strips are made of a fabric material that can get wet in the shower and pat dry with a towel. They usually fall off on their own within 7 to 10 days. You may trim the edges as they begin to curl.    You may bathe or shower on the 7th day after surgery, but do not scrub or soak the incisions. Dry the area by gently blotting it with a gauze or towel. After it is completely dry, cover the wound with clean gauze or Band-Aids®. Do NOT submerge the incisions (bath/swim) until after the sutures are removed and the wound  has completely healed.     Post-Op Medications    Pain Control  After surgery, you will feel pain. However, it is important to stay ahead of pain as it becomes challenging to get under control if you fall behind. Ice and elevation can help and should be used as much as possible in the first few days.   Narcotic pain medications, such as tramadol and oxycodone, should be taken as prescribed. The Tramadol is intended to be taken first as the primary medicine and then oxycodone taken for breakthrough pain. Wean off as soon as possible. Take these with food to decrease the chances of nausea and vomiting. Do not drink alcohol, drive a vehicle, or use heavy machinery while taking narcotic pain medications.   NSAID medications are used for pain control and to decrease inflammation. You may be prescribed an NSAID such as celebrex. Take as instructed. Other NSAID medications such as ibuprofen, Motrin, Advil, naproxen, or Aleve can be used once you have finished the celebrex, or if a prescription for celebrex was not provided.   Acetaminophen (Tylenol) is an effective over-the-counter pain medication that can be used with NSAID medications and non-acetaminophen containing narcotics such as plain oxycodone.     Blood Clot Prevention  You should take one 81 mg baby aspirin twice daily for two weeks starting the evening of the day you have surgery unless instructed otherwise or taking a different blood thinner such as enoxaparin or warfarin. If you are aware that you are at high risk for a blood clot, notify your physician as soon as possible.   Take aspirin at least 30 minutes before taking ibuprofen or Toradol.    Constipation Prevention  Anesthesia and pain medications, changes in eating and drinking, and less activity can all lead to constipation after surgery. To prevent or reduce constipation, take an over-the-counter stool softener (brands include Colace and Miralax). Follow the directions on the bottle. Drink plenty of  water and eat high fiber foods including whole grains, fresh fruits, vegetables, beans, prunes or prune juice.    Physical Therapy  Exercise is a critical component of home care, particularly during the first few weeks after surgery and this will include physical therapy, which will play a vital role in getting you back to your daily activities by helping you regain shoulder strength and motion. Physical therapy will start 3-4 days after your surgery (it can start before your first post-op visit with your doctor). It will progress as follows  Passive exercise: Even though your tear has been repaired, the muscles around your arm remain weak. Once your surgeon decides it is safe for you to move your arm and shoulder, a therapist will help you with passive exercises to improve range of motion in your shoulder. With passive exercise, your therapist supports your arm and moves it in different positions. In most cases, passive exercise is begun within the first 4 to 6 weeks after surgery.  Active exercise: After 6 weeks, you will progress to doing active exercises without the help of your therapist. Moving your muscles on your own will gradually increase your strength and improve your arm control. At 8 to 12 weeks, your therapist will start you on a strengthening exercise program.    Driving  Your physician will provide recommendations on when it is safe to drive after surgery. At minimum you must NOT be taking any narcotic/opoid medications and feel you are capable of driving. It is NOT recommended that you drive while wearing a sling.     Expectations  Your surgeon will address what appropriate expectations following the surgery will be. You can expect to have functional range of motion which includes the ability to reach the back of your head to be able to wash or scratch it and to be able to pull up your pants. You WILL NOT be able to reach and scratch or wash your back following shoulder replacement surgery.      Complications  Your orthopaedic surgeon will explain the potential risks and complications of shoulder joint replacement, including those related to the surgery itself and those that can occur over time after your surgery.When complications occur, most are successfully treatable. Possible complications include the following.    Infection: Infection is a complication of any surgery. In shoulder joint replacement, infection may occur in the wound or deep around the prosthesis. It may happen while in the hospital or after you go home. It may even occur years later. Minor infections in the wound area are generally treated with antibiotics. Major or deep infections may require more surgery and removal of the prosthesis. Any infection in your body can spread to your joint replacement.  Prosthesis Problems: Although prosthesis designs and materials, as well as surgical techniques, continue to advance, the prosthesis may wear down and the components may loosen. The components of a shoulder replacement may also dislocate. Excessive wear, loosening, or dislocation may require additional surgery (revision procedure).  Nerve damage: Nerves in the vicinity of the joint replacement may be damaged during surgery, although this type of injury is infrequent. Over time, these nerve injuries often improve and may completely recover.    Do's and Don'ts  The success of your surgery will depend largely on how well you follow your orthopaedic surgeon's instructions at home during the first few weeks after surgery. Here are some common do's and don'ts for when you return home:    DON'T use the arm to push yourself up in bed or from a chair because this requires forceful contraction of muscles.  Do follow the program of exercises prescribed for you by your surgeon and/or physical therapist.. You may need to do the exercises 2 to 3 times a day for a month or more.  DON'T overdo it! If your shoulder pain was severe before the surgery, the  experience of pain-free motion may lull you into thinking that you can do more than is prescribed. Early overuse of the shoulder may result in severe limitations in motion.  DON'T lift anything heavier than a glass of water for the first 2 to 4 weeks after surgery.  Do ask for assistance. Your physician may be able to recommend an agency or facility to help if you do not have home support.  DON'T participate in contact sports or do any repetitive heavy lifting after your shoulder replacement.  Do avoid placing your arm in any extreme position, such as straight out to the side or behind your body for the first 6 weeks after surgery.    Many thousands of patients have experienced an improved quality of life after shoulder joint replacement surgery. They experience less pain, improved motion and strength, and better function.    Links      Shoulder Replacement

## 2025-04-08 ENCOUNTER — TELEPHONE (OUTPATIENT)
Dept: PREADMISSION TESTING | Facility: HOSPITAL | Age: 60
End: 2025-04-08
Payer: COMMERCIAL

## 2025-04-09 ENCOUNTER — OFFICE VISIT (OUTPATIENT)
Dept: ORTHOPEDICS | Facility: CLINIC | Age: 60
End: 2025-04-09
Payer: COMMERCIAL

## 2025-04-09 ENCOUNTER — ANESTHESIA EVENT (OUTPATIENT)
Dept: SURGERY | Facility: HOSPITAL | Age: 60
End: 2025-04-09
Payer: COMMERCIAL

## 2025-04-09 DIAGNOSIS — M17.12 PRIMARY OSTEOARTHRITIS OF LEFT KNEE: Primary | ICD-10-CM

## 2025-04-09 PROCEDURE — 20610 DRAIN/INJ JOINT/BURSA W/O US: CPT | Mod: LT,S$GLB,, | Performed by: ORTHOPAEDIC SURGERY

## 2025-04-09 PROCEDURE — 99499 UNLISTED E&M SERVICE: CPT | Mod: S$GLB,,, | Performed by: ORTHOPAEDIC SURGERY

## 2025-04-09 PROCEDURE — 99999 PR PBB SHADOW E&M-EST. PATIENT-LVL II: CPT | Mod: PBBFAC,,, | Performed by: ORTHOPAEDIC SURGERY

## 2025-04-09 NOTE — ANESTHESIA PREPROCEDURE EVALUATION
04/09/2025  Terrance Johnson is a 60 y.o., male.      Pre-op Assessment    I have reviewed the Patient Summary Reports.    I have reviewed the NPO Status.   I have reviewed the Medications.     Review of Systems  Anesthesia Hx:   History of prior surgery of interest to airway management or planning:            Denies Personal Hx of Anesthesia complications.                    Social:  Non-Smoker       Hematology/Oncology:  Hematology Normal                                     Cardiovascular:     Hypertension                                    Hypertension         Pulmonary:  Pulmonary Normal                       Hepatic/GI:  Hepatic/GI Normal                    Endocrine:  Endocrine Normal            Psych:   anxiety                 Physical Exam  General: Well nourished    Airway:  Mallampati: III   Mouth Opening: Normal  TM Distance: 4 - 6 cm  Tongue: Normal  Neck ROM: Normal ROM    Dental:  Intact        Anesthesia Plan  Type of Anesthesia, risks & benefits discussed:    Anesthesia Type: Gen ETT  Intra-op Monitoring Plan: Standard ASA Monitors  Post Op Pain Control Plan: multimodal analgesia, peripheral nerve block and IV/PO Opioids PRN  Induction:  IV  Informed Consent: Informed consent signed with the Patient and all parties understand the risks and agree with anesthesia plan.  All questions answered. Patient consented to blood products? No  ASA Score: 2  Day of Surgery Review of History & Physical: H&P Update referred to the surgeon/provider.    Ready For Surgery From Anesthesia Perspective.     .

## 2025-04-09 NOTE — PROGRESS NOTES
Orthopaedics  Post-operative follow-up    Procedure Performed:   Left Total Shoulder Arthroplasty     Date of Surgery: 04/14/2025    Subjective: Terrance Johnson is now almost 2 weeks out from his shoulder surgery.  He is doing well with no specific complaints other than the expected post-operative pain and stiffness.  He has been compliant with post-operative restrictions. He has started PT at [facility].       Exam:  Sutures removed, incision site benign with no drainage or redness  Mild bruising as anticipated  ROM fluid, will be formally assessed at next visit  Axillary nerve sensation and motor intact  Motor and sensory intact distally  Strong radial pulse, fingers warm and well perfused    Imaging:  EXAM: XR SHOULDER COMPLETE 2 OR MORE VIEWS LEFT     CLINICAL HISTORY: Left shoulder pain     TECHNIQUE: Left shoulder, 4 views     COMPARISON:  No studies are available for comparison.     FINDINGS: Left humeral arthroplasty in satisfactory position without evidence of loosening or failure.  Negative for fracture. Mild acromio clavicular arthrosis.        Impression:   As above.           Finalized on: 4/29/2025 9:01 AM By:  KAREN Huitron MD, MD  [facility]# [id number]      2025-04-29 09:04:[id number]     [facility]    Impression:  S/p left total shoulder arthroplasty, initial post-operative visit - doing well    Plan:  Discussed surgical findings, operative procedure  Reviewed post-operative instructions, restrictions, and rehabilitation  Provided PT script and protocol  Symptomatic treatment for pain / swelling  Instructed patient to call clinic if questions or concerns      Follow-up in 1 month with Dr. English at 6 weeks post-op    Work status:  For weeks 0-4 from now:  1) Sling immobilization at all times, one armed work (other than typing)  2) Allow time for physical therapy    For weeks 4-8 from now:  1) Discontinue sling  2) Continue physical therapy  3) No lifting/pushing/pulling greater than 2 pounds  4) No  overhead work  5) No repetitive motions        Josefina Mckeon PA-C  Sports Medicine Physician Assistant       Disclaimer: This note was prepared using a voice recognition system and is likely to have sound alike errors within the text.

## 2025-04-09 NOTE — PROCEDURES
Large Joint Aspiration/Injection: L knee    Date/Time: 4/9/2025 12:45 PM    Performed by: Emre Purdy MD  Authorized by: Emre Purdy MD    Consent Done?:  Yes (Verbal)  Indications:  Pain and arthritis  Timeout: prior to procedure the correct patient, procedure, and site was verified    Prep: patient was prepped and draped in usual sterile fashion    Local anesthesia used?: No      Details:  Needle Size:  22 G  Ultrasonic Guidance for needle placement?: No    Approach:  Medial  Location:  Knee  Site:  L knee  Medications:  20 mg sodium hyaluronate (EUFLEXXA) 10 mg/mL(mw 2.4 -3.6 million)  Patient tolerance:  Patient tolerated the procedure well with no immediate complications

## 2025-04-11 NOTE — PROGRESS NOTES
Called and spoke with the patient about the following:      Your Surgery arrival time is at 7:30am on 04/14/25 at [facility].   The address is [address]. [place] [address].       *If you are running late or have questions the morning of surgery, please call the Pre-OP Department @ [phone].     Do not eat or drink after 12 midnight, including water. Do not smoke or use chewing tobacco after 12 midnight!  OK to brush teeth, but no gum, candy, or mints!      Additional Instructions:  You may be required to provide a urine sample prior to procedure;   Please ask  for a specimen cup if you need to use the restroom prior to being called into pre-op.     Please come to the main lobby and be prepared to show your photo ID and insurance card.       Only take specific medications discussed with the Pre-Admit Nurse.      Please call with any questions or concerns ([phone] or [phone])    [facility] Visitor/Ride Policy:   Adults:  Only 1 adult allowed (must be 18 or older) to accompany you and MUST STAY through the entire length of admission.     -Must have a ride home from a responsible adult that you know and trust.    -Medical Transport, Uber or Lyft can only be used if patient has a responsible adult to accompany them during ride home.    Pediatrics:  Pediatric patients are encouraged to have 2 adults (must be 18 or older) accompany them to the surgery center.   ~If the parent/legal guardian is unable to stay for the duration of the surgery time,   you MUST have someone over the age of 18 able to stay with the patient until time of discharge.     ~The parent/legal guardian must be available to be reached by phone at all times if they are unable to stay with the patient.

## 2025-04-14 ENCOUNTER — HOSPITAL ENCOUNTER (OUTPATIENT)
Facility: HOSPITAL | Age: 60
Discharge: HOME | End: 2025-04-14
Attending: STUDENT IN AN ORGANIZED HEALTH CARE EDUCATION/TRAINING PROGRAM | Admitting: STUDENT IN AN ORGANIZED HEALTH CARE EDUCATION/TRAINING PROGRAM
Payer: COMMERCIAL

## 2025-04-14 ENCOUNTER — HOSPITAL ENCOUNTER (OUTPATIENT)
Dept: RADIOLOGY | Facility: HOSPITAL | Age: 60
Discharge: HOME | End: 2025-04-14
Attending: STUDENT IN AN ORGANIZED HEALTH CARE EDUCATION/TRAINING PROGRAM | Admitting: STUDENT IN AN ORGANIZED HEALTH CARE EDUCATION/TRAINING PROGRAM
Payer: COMMERCIAL

## 2025-04-14 ENCOUNTER — ANESTHESIA (OUTPATIENT)
Dept: SURGERY | Facility: HOSPITAL | Age: 60
End: 2025-04-14
Payer: COMMERCIAL

## 2025-04-14 DIAGNOSIS — M19.012 GLENOHUMERAL ARTHRITIS, LEFT: Primary | ICD-10-CM

## 2025-04-14 PROCEDURE — 71000015 HC POSTOP RECOV 1ST HR: Performed by: STUDENT IN AN ORGANIZED HEALTH CARE EDUCATION/TRAINING PROGRAM

## 2025-04-14 PROCEDURE — 63600175 PHARM REV CODE 636 W HCPCS: Performed by: ANESTHESIOLOGY

## 2025-04-14 PROCEDURE — 25000003 PHARM REV CODE 250: Performed by: NURSE ANESTHETIST, CERTIFIED REGISTERED

## 2025-04-14 PROCEDURE — C1769 GUIDE WIRE: HCPCS | Performed by: STUDENT IN AN ORGANIZED HEALTH CARE EDUCATION/TRAINING PROGRAM

## 2025-04-14 PROCEDURE — 73020 X-RAY EXAM OF SHOULDER: CPT | Mod: TC,LT

## 2025-04-14 PROCEDURE — 64415 NJX AA&/STRD BRCH PLXS IMG: CPT | Performed by: ANESTHESIOLOGY

## 2025-04-14 PROCEDURE — 63600175 PHARM REV CODE 636 W HCPCS: Performed by: NURSE ANESTHETIST, CERTIFIED REGISTERED

## 2025-04-14 PROCEDURE — 73020 X-RAY EXAM OF SHOULDER: CPT | Mod: 26,LT,, | Performed by: RADIOLOGY

## 2025-04-14 PROCEDURE — 27200703 HC ULTRASOUND NDL GUIDE: Performed by: ANESTHESIOLOGY

## 2025-04-14 PROCEDURE — 37000009 HC ANESTHESIA EA ADD 15 MINS: Performed by: STUDENT IN AN ORGANIZED HEALTH CARE EDUCATION/TRAINING PROGRAM

## 2025-04-14 PROCEDURE — 36000710: Performed by: STUDENT IN AN ORGANIZED HEALTH CARE EDUCATION/TRAINING PROGRAM

## 2025-04-14 PROCEDURE — 23472 RECONSTRUCT SHOULDER JOINT: CPT | Mod: LT,,, | Performed by: STUDENT IN AN ORGANIZED HEALTH CARE EDUCATION/TRAINING PROGRAM

## 2025-04-14 PROCEDURE — 36000711: Performed by: STUDENT IN AN ORGANIZED HEALTH CARE EDUCATION/TRAINING PROGRAM

## 2025-04-14 PROCEDURE — 27200750 HC INSULATED NEEDLE/ STIMUPLEX: Performed by: ANESTHESIOLOGY

## 2025-04-14 PROCEDURE — 37000008 HC ANESTHESIA 1ST 15 MINUTES: Performed by: STUDENT IN AN ORGANIZED HEALTH CARE EDUCATION/TRAINING PROGRAM

## 2025-04-14 PROCEDURE — C1713 ANCHOR/SCREW BN/BN,TIS/BN: HCPCS | Performed by: STUDENT IN AN ORGANIZED HEALTH CARE EDUCATION/TRAINING PROGRAM

## 2025-04-14 PROCEDURE — C1776 JOINT DEVICE (IMPLANTABLE): HCPCS | Performed by: STUDENT IN AN ORGANIZED HEALTH CARE EDUCATION/TRAINING PROGRAM

## 2025-04-14 PROCEDURE — 63600175 PHARM REV CODE 636 W HCPCS: Performed by: STUDENT IN AN ORGANIZED HEALTH CARE EDUCATION/TRAINING PROGRAM

## 2025-04-14 PROCEDURE — 64450 NJX AA&/STRD OTHER PN/BRANCH: CPT | Performed by: STUDENT IN AN ORGANIZED HEALTH CARE EDUCATION/TRAINING PROGRAM

## 2025-04-14 PROCEDURE — 27201423 OPTIME MED/SURG SUP & DEVICES STERILE SUPPLY: Performed by: STUDENT IN AN ORGANIZED HEALTH CARE EDUCATION/TRAINING PROGRAM

## 2025-04-14 PROCEDURE — 71000033 HC RECOVERY, INTIAL HOUR: Performed by: STUDENT IN AN ORGANIZED HEALTH CARE EDUCATION/TRAINING PROGRAM

## 2025-04-14 DEVICE — ALTIVATE ANATOMIC, HUMERAL NECK, NEUTRAL
Type: IMPLANTABLE DEVICE | Site: SHOULDER | Status: FUNCTIONAL
Brand: DJO SURGICAL

## 2025-04-14 DEVICE — ALTIVATE ANATOMIC, SHORT HUMERAL STEM, 12MM
Type: IMPLANTABLE DEVICE | Site: SHOULDER | Status: FUNCTIONAL
Brand: DJO SURGICAL

## 2025-04-14 DEVICE — CEMENT BONE SURG SMPLX P RADPQ: Type: IMPLANTABLE DEVICE | Site: SHOULDER | Status: FUNCTIONAL

## 2025-04-14 RX ORDER — FENTANYL CITRATE 50 UG/ML
25 INJECTION, SOLUTION INTRAMUSCULAR; INTRAVENOUS EVERY 5 MIN PRN
Status: DISCONTINUED | OUTPATIENT
Start: 2025-04-14 | End: 2025-04-14 | Stop reason: HOSPADM

## 2025-04-14 RX ORDER — TRAMADOL HYDROCHLORIDE 50 MG/1
50 TABLET ORAL
Qty: 36 TABLET | Refills: 0 | Status: SHIPPED | OUTPATIENT
Start: 2025-04-14

## 2025-04-14 RX ORDER — FENTANYL CITRATE 50 UG/ML
INJECTION, SOLUTION INTRAMUSCULAR; INTRAVENOUS
Status: DISCONTINUED | OUTPATIENT
Start: 2025-04-14 | End: 2025-04-14

## 2025-04-14 RX ORDER — OXYCODONE HYDROCHLORIDE 5 MG/1
5 TABLET ORAL EVERY 4 HOURS PRN
Qty: 36 TABLET | Refills: 0 | Status: SHIPPED | OUTPATIENT
Start: 2025-04-14

## 2025-04-14 RX ORDER — CEFAZOLIN SODIUM 1 G/3ML
1 INJECTION, POWDER, FOR SOLUTION INTRAMUSCULAR; INTRAVENOUS ONCE
Status: COMPLETED | OUTPATIENT
Start: 2025-04-14 | End: 2025-04-14

## 2025-04-14 RX ORDER — CELECOXIB 200 MG/1
200 CAPSULE ORAL 2 TIMES DAILY
Qty: 28 CAPSULE | Refills: 0 | Status: SHIPPED | OUTPATIENT
Start: 2025-04-14

## 2025-04-14 RX ORDER — TRANEXAMIC ACID 10 MG/ML
INJECTION, SOLUTION INTRAVENOUS
Status: DISCONTINUED | OUTPATIENT
Start: 2025-04-14 | End: 2025-04-14

## 2025-04-14 RX ORDER — ROCURONIUM BROMIDE 10 MG/ML
INJECTION, SOLUTION INTRAVENOUS
Status: DISCONTINUED | OUTPATIENT
Start: 2025-04-14 | End: 2025-04-14

## 2025-04-14 RX ORDER — OXYCODONE AND ACETAMINOPHEN 5; 325 MG/1; MG/1
1 TABLET ORAL
Status: DISCONTINUED | OUTPATIENT
Start: 2025-04-14 | End: 2025-04-14 | Stop reason: HOSPADM

## 2025-04-14 RX ORDER — CEFAZOLIN SODIUM 1 G/3ML
INJECTION, POWDER, FOR SOLUTION INTRAMUSCULAR; INTRAVENOUS
Status: DISCONTINUED | OUTPATIENT
Start: 2025-04-14 | End: 2025-04-14

## 2025-04-14 RX ORDER — SUCCINYLCHOLINE CHLORIDE 20 MG/ML
INJECTION INTRAMUSCULAR; INTRAVENOUS
Status: DISCONTINUED | OUTPATIENT
Start: 2025-04-14 | End: 2025-04-14

## 2025-04-14 RX ORDER — ACETAMINOPHEN 10 MG/ML
INJECTION, SOLUTION INTRAVENOUS
Status: DISCONTINUED | OUTPATIENT
Start: 2025-04-14 | End: 2025-04-14

## 2025-04-14 RX ORDER — MIDAZOLAM HYDROCHLORIDE 1 MG/ML
INJECTION INTRAMUSCULAR; INTRAVENOUS
Status: DISCONTINUED | OUTPATIENT
Start: 2025-04-14 | End: 2025-04-14

## 2025-04-14 RX ORDER — EPHEDRINE SULFATE 50 MG/ML
INJECTION, SOLUTION INTRAVENOUS
Status: DISCONTINUED | OUTPATIENT
Start: 2025-04-14 | End: 2025-04-14

## 2025-04-14 RX ORDER — TRANEXAMIC ACID 10 MG/ML
INJECTION, SOLUTION INTRAVENOUS
Status: COMPLETED
Start: 2025-04-14 | End: 2025-04-14

## 2025-04-14 RX ORDER — LIDOCAINE HYDROCHLORIDE 20 MG/ML
INJECTION, SOLUTION EPIDURAL; INFILTRATION; INTRACAUDAL; PERINEURAL
Status: DISCONTINUED | OUTPATIENT
Start: 2025-04-14 | End: 2025-04-14

## 2025-04-14 RX ORDER — ONDANSETRON HYDROCHLORIDE 2 MG/ML
4 INJECTION, SOLUTION INTRAVENOUS DAILY PRN
Status: DISCONTINUED | OUTPATIENT
Start: 2025-04-14 | End: 2025-04-14 | Stop reason: HOSPADM

## 2025-04-14 RX ORDER — ONDANSETRON HYDROCHLORIDE 2 MG/ML
INJECTION, SOLUTION INTRAVENOUS
Status: DISCONTINUED | OUTPATIENT
Start: 2025-04-14 | End: 2025-04-14

## 2025-04-14 RX ORDER — ASPIRIN 81 MG/1
81 TABLET ORAL 2 TIMES DAILY
Qty: 28 TABLET | Refills: 0 | Status: SHIPPED | OUTPATIENT
Start: 2025-04-14 | End: 2025-04-28

## 2025-04-14 RX ORDER — GLUCAGON 1 MG
1 KIT INJECTION
Status: DISCONTINUED | OUTPATIENT
Start: 2025-04-14 | End: 2025-04-14 | Stop reason: HOSPADM

## 2025-04-14 RX ORDER — PROPOFOL 10 MG/ML
VIAL (ML) INTRAVENOUS
Status: DISCONTINUED | OUTPATIENT
Start: 2025-04-14 | End: 2025-04-14

## 2025-04-14 RX ORDER — PHENYLEPHRINE HYDROCHLORIDE 10 MG/ML
INJECTION INTRAVENOUS
Status: DISCONTINUED | OUTPATIENT
Start: 2025-04-14 | End: 2025-04-14

## 2025-04-14 RX ORDER — ROPIVACAINE HYDROCHLORIDE 5 MG/ML
INJECTION, SOLUTION EPIDURAL; INFILTRATION; PERINEURAL
Status: COMPLETED | OUTPATIENT
Start: 2025-04-14 | End: 2025-04-14

## 2025-04-14 RX ORDER — LIDOCAINE HYDROCHLORIDE 20 MG/ML
INJECTION, SOLUTION EPIDURAL; INFILTRATION; INTRACAUDAL; PERINEURAL
Status: COMPLETED | OUTPATIENT
Start: 2025-04-14 | End: 2025-04-14

## 2025-04-14 RX ORDER — DEXAMETHASONE SODIUM PHOSPHATE 4 MG/ML
INJECTION, SOLUTION INTRA-ARTICULAR; INTRALESIONAL; INTRAMUSCULAR; INTRAVENOUS; SOFT TISSUE
Status: DISCONTINUED | OUTPATIENT
Start: 2025-04-14 | End: 2025-04-14

## 2025-04-14 RX ORDER — ACETAMINOPHEN 500 MG
1000 TABLET ORAL EVERY 8 HOURS PRN
Qty: 60 TABLET | Refills: 0 | Status: SHIPPED | OUTPATIENT
Start: 2025-04-14

## 2025-04-14 RX ADMIN — ROCURONIUM BROMIDE 10 MG: 10 SOLUTION INTRAVENOUS at 11:04

## 2025-04-14 RX ADMIN — PHENYLEPHRINE HYDROCHLORIDE 200 MCG: 10 INJECTION INTRAVENOUS at 10:04

## 2025-04-14 RX ADMIN — DEXAMETHASONE SODIUM PHOSPHATE 8 MG: 4 INJECTION, SOLUTION INTRA-ARTICULAR; INTRALESIONAL; INTRAMUSCULAR; INTRAVENOUS; SOFT TISSUE at 09:04

## 2025-04-14 RX ADMIN — LIDOCAINE HYDROCHLORIDE 3 ML: 20 INJECTION, SOLUTION EPIDURAL; INFILTRATION; INTRACAUDAL; PERINEURAL at 09:04

## 2025-04-14 RX ADMIN — ONDANSETRON 4 MG: 2 INJECTION INTRAMUSCULAR; INTRAVENOUS at 12:04

## 2025-04-14 RX ADMIN — LIDOCAINE HYDROCHLORIDE 80 MG: 20 INJECTION, SOLUTION EPIDURAL; INFILTRATION; INTRACAUDAL; PERINEURAL at 09:04

## 2025-04-14 RX ADMIN — ONDANSETRON 4 MG: 2 INJECTION INTRAMUSCULAR; INTRAVENOUS at 01:04

## 2025-04-14 RX ADMIN — FENTANYL CITRATE 50 MCG: 50 INJECTION, SOLUTION INTRAMUSCULAR; INTRAVENOUS at 09:04

## 2025-04-14 RX ADMIN — TRANEXAMIC ACID 1000 MG: 10 INJECTION, SOLUTION INTRAVENOUS at 12:04

## 2025-04-14 RX ADMIN — PHENYLEPHRINE HYDROCHLORIDE 200 MCG: 10 INJECTION INTRAVENOUS at 11:04

## 2025-04-14 RX ADMIN — GLYCOPYRROLATE 0.2 MG: 0.2 INJECTION, SOLUTION INTRAMUSCULAR; INTRAVENOUS at 09:04

## 2025-04-14 RX ADMIN — ROPIVACAINE HYDROCHLORIDE 20 ML: 5 INJECTION, SOLUTION EPIDURAL; INFILTRATION; PERINEURAL at 09:04

## 2025-04-14 RX ADMIN — PHENYLEPHRINE HYDROCHLORIDE 200 MCG: 10 INJECTION INTRAVENOUS at 09:04

## 2025-04-14 RX ADMIN — PROPOFOL 200 MG: 10 INJECTION, EMULSION INTRAVENOUS at 09:04

## 2025-04-14 RX ADMIN — FENTANYL CITRATE 50 MCG: 50 INJECTION, SOLUTION INTRAMUSCULAR; INTRAVENOUS at 12:04

## 2025-04-14 RX ADMIN — EPHEDRINE SULFATE 10 MG: 50 INJECTION INTRAVENOUS at 11:04

## 2025-04-14 RX ADMIN — ROCURONIUM BROMIDE 20 MG: 10 SOLUTION INTRAVENOUS at 10:04

## 2025-04-14 RX ADMIN — TRANEXAMIC ACID 1000 MG: 10 INJECTION, SOLUTION INTRAVENOUS at 10:04

## 2025-04-14 RX ADMIN — ROCURONIUM BROMIDE 40 MG: 10 SOLUTION INTRAVENOUS at 09:04

## 2025-04-14 RX ADMIN — SODIUM CHLORIDE, POTASSIUM CHLORIDE, SODIUM LACTATE AND CALCIUM CHLORIDE: 600; 310; 30; 20 INJECTION, SOLUTION INTRAVENOUS at 10:04

## 2025-04-14 RX ADMIN — SODIUM CHLORIDE, POTASSIUM CHLORIDE, SODIUM LACTATE AND CALCIUM CHLORIDE: 600; 310; 30; 20 INJECTION, SOLUTION INTRAVENOUS at 09:04

## 2025-04-14 RX ADMIN — ROCURONIUM BROMIDE 10 MG: 10 SOLUTION INTRAVENOUS at 09:04

## 2025-04-14 RX ADMIN — MIDAZOLAM HYDROCHLORIDE 2 MG: 1 INJECTION, SOLUTION INTRAMUSCULAR; INTRAVENOUS at 09:04

## 2025-04-14 RX ADMIN — SUGAMMADEX 200 MG: 100 INJECTION, SOLUTION INTRAVENOUS at 12:04

## 2025-04-14 RX ADMIN — ACETAMINOPHEN 1000 MG: 10 INJECTION, SOLUTION INTRAVENOUS at 09:04

## 2025-04-14 RX ADMIN — CEFAZOLIN 1 G: 1 INJECTION, POWDER, FOR SOLUTION INTRAMUSCULAR; INTRAVENOUS at 01:04

## 2025-04-14 RX ADMIN — CEFAZOLIN 2 G: 330 INJECTION, POWDER, FOR SOLUTION INTRAMUSCULAR; INTRAVENOUS at 09:04

## 2025-04-14 RX ADMIN — SUCCINYLCHOLINE CHLORIDE 120 MG: 20 INJECTION, SOLUTION INTRAMUSCULAR; INTRAVENOUS; PARENTERAL at 09:04

## 2025-04-14 NOTE — DISCHARGE INSTRUCTIONS
TOTAL SHOULDER ARTHROPLASTY    Contact the Sports Medicine Clinic at [phone] if you have questions about your instructions or follow-up appointment.    DIET:   Start with clear liquids and light foods to minimize nausea. Once these are tolerated, advance to a regular diet.     DRESSING AND WOUND CARE:   Keep the dressing clean and dry. It is normal for there to be some drainage after surgery since the shoulder was irrigated with large amounts of fluid. Reinforce with additional gauze as necessary.  Remove the dressing the 7th day after surgery and begin changing daily with clean gauze or Band-Aids®. Keep your incisions covered until you follow up in clinic.   If you have Steri-Strips in place of stitches, allow them to stay in place as long as possible. Steri-Strips are made of a fabric material that can get wet in the shower and pat dry with a towel. They usually fall off on their own within 7 to 10 days. You may trim the edges as they begin to curl.      BATHING:   You may bathe or shower on the 7th day after surgery, but do not scrub or soak the incisions. Dry the area by gently blotting it with a gauze or towel. After it is completely dry, cover the wound with clean gauze or Band-Aids®. Do NOT submerge the incisions (bath/swim) until after the sutures are removed and the wound has completely healed.     ACTIVITY:    Ice should be applied to the shoulder for 20-30 minutes, 5-6 times a day, to help control pain and swelling. Apply additional times as needed, especially after exercise, for the first 3-4 weeks. Do not apply ice directly to the skin; use a thin barrier in between. Also, do not use heat.    Elevate the shoulder by sleeping as upright as possible using extra pillows or a recliner. Do this for the first few days to help decrease pain and swelling.   Wear the sling at all times for 6 weeks including while sleeping. The only time you may remove the sling is for bathing and exercises. Do not lean  or put your body weight on your arm.   When your block wears off, start the following exercises:  Remove the sling for 5-10 minutes, 3 times a day, to do the following exercises:  Fully bend & straighten your fingers, your wrist, and your elbow several times.   Lean forward, bracing yourself on a table/counter with your normal arm. Let your surgical arm relax and hang straight down. Shift your weight so that your arm moves side to side, front to back, and in gentle circles like a pendulum or elephant's trunk. Use your body to generate the movement for this, NOT your surgical shoulder's muscles. (see drawing below)     Physical therapy should be started within 3 days of surgery. Take your therapy prescription to the PT clinic of your choice. If you have not received a therapy prescription, please contact your surgeon's office to have a script sent to your physical therapist.     PAIN CONTROL:    It is important to stay ahead of pain as it becomes challenging to get under control if you fall behind. Ice and elevation can help and should be used as much as possible in the first few days.   Narcotic pain medications, such as tramadol and oxycodone, should be taken as prescribed. The Tramadol is intended to be taken first as the primary medicine and then oxycodone taken for breakthrough pain. Wean off as soon as possible. Take these with food to decrease the chances of nausea and vomiting. Do not drink alcohol, drive a vehicle, or use heavy machinery while taking narcotic pain medications.    NSAID medications are used for pain control and to decrease inflammation. You may be prescribed an NSAID such as celebrex. Take as instructed. Other NSAID medications such as ibuprofen, Motrin, Advil, naproxen, or Aleve can be used once you have finished the celebrex, or if a prescription for celebrex was not provided.    Acetaminophen (Tylenol) is an effective over-the-counter pain medication that can be used with NSAID medications  and non-acetaminophen containing narcotics such as plain oxycodone.    ASPIRIN FOR PREVENTION OF BLOOD CLOTS:   You should take one 81 mg baby aspirin twice daily for two weeks starting the evening of the day you have surgery unless instructed otherwise or taking a different blood thinner such as enoxaparin or warfarin. If you are aware that you are at high risk for a blood clot, notify your physician as soon as possible.   Take aspirin at least 30 minutes before taking ibuprofen or Toradol.    CONSTIPATION PREVENTION:   Anesthesia and pain medications, changes in eating and drinking, and less activity can all lead to constipation after surgery. To prevent or reduce constipation, take an over-the-counter stool softener (brands include Colace and Miralax).  Follow the directions on the bottle. Drink plenty of water and eat high fiber foods including whole grains, fresh fruits, vegetables, beans, prunes or prune juice.     PROBLEMS TO REPORT:  Persistent bloody drainage that soaks through reinforced dressings.  Fever greater than 101F or 38C.   Incision that is very red, swollen, draining pus, shows red streaks, or feels hot.  Inability to urinate within 8 hours of surgery (a rare effect of the anesthesia).  If you develop a rash, generalized itching or swelling from the medications, STOP the medication and call the clinic or the orthopedic surgery resident on call.    Daytime calls should be directed to the Sports Medicine Clinic at [phone].   Night-time and weekend calls should be directed to the [facility] nurse line at [phone].       FREQUENTLY ASKED QUESTIONS     WHAT DAILY ACTIVITIES CAN I DO?  After shoulder surgery, you may do what you feel comfortable doing in the sling.  Do not lift anything with your operative arm or put yourself at risk of falling.    CAN I DRIVE OR RIDE BY CAR/ TRAIN/ PLANE?   You should not drive while using a sling. There are no forced restrictions regarding  operating a motor vehicle, however you must always be the  of whether you are able to operate it safely. You should not drive while taking narcotic pain medications. You may ride in a car after surgery as needed. You may take a train or even fly the day after your surgery as long as you feel secure and comfortable.    WHAT ABOUT WORK?   You may return to an office-type job or to school whenever comfortable. For most patients this occurs 1-2 weeks after surgery. For more active jobs that require some lifting, you can wait until after your follow-up appointment. Any other unusual types of jobs should be discussed to determine a date for return to work.    WHAT ABOUT SWELLING?   Expect swelling as a normal process after surgery. Ice, elevation, and other treatments provided at physical therapy will allow this to improve in time. Some swelling may remain for up to 8 weeks, and this is normal.     WHAT IF IT REALLY HURTS TOO MUCH?   Surgery hurts and you cannot expect to be pain free, but our goal is for it to be tolerable. Try to use all available pain therapies such as narcotics, NSAIDS, and acetaminophen. Always try more ice and elevation. If the pain is not tolerable, call the clinic or the after hours nursing line.

## 2025-04-14 NOTE — ANESTHESIA PROCEDURE NOTES
Peripheral Block    Patient location during procedure: pre-op   Block not for primary anesthetic.  Reason for block: at surgeon's request and post-op pain management   Post-op Pain Location: left shoulder   Start time: 4/14/2025 9:15 AM  Timeout: 4/14/2025 9:15 AM   End time: 4/14/2025 9:20 AM    Staffing  Authorizing Provider: Rohit Mendez MD  Performing Provider: Rohit Mendez MD    Staffing  Performed by: Rohit Mendez MD  Authorized by: Rohit Mendez MD    Preanesthetic Checklist  Completed: patient identified, IV checked, site marked, risks and benefits discussed, surgical consent, monitors and equipment checked, pre-op evaluation and timeout performed  Peripheral Block  Patient position: sitting  Prep: ChloraPrep  Patient monitoring: heart rate, cardiac monitor, continuous pulse ox, continuous capnometry and frequent blood pressure checks  Block type: interscalene  Laterality: left  Injection technique: single shot  Needle  Needle type: Stimuplex   Needle gauge: 22 G  Needle length: 2 in  Needle localization: anatomical landmarks and ultrasound guidance   -ultrasound image captured on disc.  Assessment  Injection assessment: negative aspiration, negative parasthesia and local visualized surrounding nerve  Paresthesia pain: none  Heart rate change: no  Slow fractionated injection: yes  Pain Tolerance: no complaints  Medications:    Medications: ropivacaine (NAROPIN) injection 0.5% - Perineural   20 mL - 4/14/2025 9:15:00 AM  lidocaine (PF) 20 mg/mL (2%) injection - Other   3 mL - 4/14/2025 9:15:00 AM    Additional Notes  VSS.  [facility] RN monitoring vitals throughout procedure.  Patient tolerated procedure well.

## 2025-04-14 NOTE — PLAN OF CARE
Left interscalene nerve block performed by Dr. Mendez with Maya RN at bedside. Pt on continuous cardiac monitoring. Patient tolerated well.

## 2025-04-14 NOTE — ANESTHESIA PROCEDURE NOTES
Intubation    Date/Time: 4/14/2025 9:44 AM    Performed by: Laurel Bowie CRNA  Authorized by: Rohit Mendez MD    Intubation:     Induction:  Intravenous    Intubated:  Postinduction    Mask Ventilation:  Moderately difficult with oral airway    Attempts:  1    Attempted By:  CRNA    Method of Intubation:  Video laryngoscopy    Blade:  Arreguin 3    Laryngeal View Grade: Grade I - full view of cords      Difficult Airway Encountered?: No      Complications:  None    Airway Device:  Oral endotracheal tube    Airway Device Size:  7.5    Style/Cuff Inflation:  Cuffed    Inflation Amount (mL):  7    Tube secured:  22    Secured at:  The lips    Placement Verified By:  Capnometry    Complicating Factors:  Anterior larynx and short neck    Findings Post-Intubation:  BS equal bilateral and atraumatic/condition of teeth unchanged

## 2025-04-14 NOTE — DISCHARGE SUMMARY
[facility]  Discharge Note  Short Stay    Procedure(s) (LRB):  ARTHROPLASTY, SHOULDER (Left)      OUTCOME: Patient tolerated treatment/procedure well without complication and is now ready for discharge.    DISPOSITION: Home or Self Care    FINAL DIAGNOSIS:  left shoulder glenohumeral arthritis    FOLLOWUP: In clinic    DISCHARGE INSTRUCTIONS:  No discharge procedures on file.     TIME SPENT ON DISCHARGE: 10 minutes

## 2025-04-14 NOTE — ANESTHESIA POSTPROCEDURE EVALUATION
Anesthesia Post Evaluation    Patient: Terrance Johnson    Procedure(s) Performed: Procedure(s) (LRB):  ARTHROPLASTY, SHOULDER (Left)    Final Anesthesia Type: general      Patient location during evaluation: PACU  Patient participation: Yes- Able to Participate  Level of consciousness: awake and alert and oriented  Post-procedure vital signs: reviewed and stable  Pain management: adequate  Airway patency: patent    PONV status at discharge: No PONV  Anesthetic complications: no      Cardiovascular status: blood pressure returned to baseline, stable and hemodynamically stable  Respiratory status: unassisted  Hydration status: euvolemic  Follow-up not needed.              Vitals Value Taken Time   /62 04/14/25 13:41   Temp 36.1 °C (97 °F) 04/14/25 12:41   Pulse 69 04/14/25 13:45   Resp 15 04/14/25 13:40   SpO2 94 % 04/14/25 13:45   Vitals shown include unfiled device data.      Event Time   Out of Recovery 13:20:00         Pain/Pallavi Score: No data recorded

## 2025-04-14 NOTE — TRANSFER OF CARE
"Anesthesia Transfer of Care Note    Patient: Terrance Johnson    Procedure(s) Performed: Procedure(s) (LRB):  ARTHROPLASTY, SHOULDER (Left)    Patient location: PACU    Anesthesia Type: general    Transport from OR: Transported from OR on room air with adequate spontaneous ventilation    Post pain: adequate analgesia    Post assessment: no apparent anesthetic complications and tolerated procedure well    Post vital signs: stable    Level of consciousness: awake    Nausea/Vomiting: no nausea/vomiting    Complications: none    Transfer of care protocol was followed      Last vitals: Visit Vitals  BP (!) 147/80 (BP Location: Right arm, Patient Position: Lying)   Pulse 61   Temp 36.6 °C (97.8 °F) (Temporal)   Resp 18   Ht 5' 6" (1.676 m)   Wt 96 kg (211 lb 10.3 oz)   SpO2 (!) 85%   BMI 34.16 kg/m²     "

## 2025-04-14 NOTE — OP NOTE
ORTHOPAEDIC SURGERY OPERATIVE REPORT    Patient Name: Terrance Johnson    Date of Surgery: 4/14/2025    Medical Record #: [id number]     Pre-operative Diagnosis:  Left Glenohumeral Osteoarthritis, End Stage    Post-operative Diagnosis:  Left Glenohumeral Osteoarthritis, End Stage    Procedure:  Left Total Shoulder Arthroplasty     Primary Surgeon: Jeremías English MD    First Assistant: SMA Johnathan    Implants Utilized:    Implant Name Type Inv. Item Serial No.  Lot No. LRB No. Used Action   CEMENT BONE SURG SMPLX P RADPQ - [id number] Cement CEMENT BONE SURG SMPLX P RADPQ  Squrl. [id number] Left 1 Implanted   ALL POLY PEGGED GLENOID, E- PLUS Joint Replacement    [id number] Left 1 Implanted   STEM HUM ANATOMIC ALTIVATE 12 - [id number] Joint Replacement STEM HUM ANATOMIC ALTIVATE 12  DJO [id number] Left 1 Implanted   STEM HUM ANATOMIC ALTIVATE NT - [id number] Joint Replacement STEM HUM ANATOMIC ALTIVATE NT  DJO [id number] Left 1 Implanted   OFFSET HUMERAL HEAD, 50MM X 18MM Joint Replacement   DJO [id number] Left 1 Implanted and Explanted   NEUTRAL HUMERAL HEAD, 50MM X 18MM Joint Replacement   DJO [id number] Left 1 Implanted         Anesthesia:  General endotracheal anesthetic with accompanying regional block.    Complications:  None    Estimated Blood Loss: 250 mL    Indications for Procedure:  Terrance Johnson is a 60 y.o. male who presents at this time with end stage glenohumeral osteoarthritis.  he has persistent pain and activity derived symptoms with deterioration in function and limitations in quality of life.  he has failed conservative options at this time and presents today for elective total shoulder replacement.    Description of Procedure:  This patient was taken to the operating room and placed in a supine position on the operating table.  The patient was correctly identified upon entry and the operative site had been marked by the operating surgeon, and this was verified.  Intravenous  antibiotics were administered prior to skin incision.  At this time general endotracheal anesthesia was initiated by the attending anesthesiologist and after successful induction of anesthetic, the patient was then positioned in the modified upright beach chair position.  The head was placed at neutral, all bony prominences were well padded, and the arm was supported in an articulated arm positioner.  After timeout was called the operative extremity was correctly identified by the operating surgeon and was subsequently sterilely prepped and draped in the usual surgical fashion and the procedure was then commenced.    A delto-pectoral approach was carried out in the standard fashion.  After an oblique skin incision was made, dissection was carried down to the deep tissues.  Hemostasis was achieved.  The deltopectoral interval was identified and the cephalic vein was mobilized laterally and protected.  The deltoid and pectoralis major muscles were mobilized and a deep self-retaining retractor was placed.  The venae comitantes were cauterized and the biceps tendon was identified and dissected out.  This included opening the rotator interval and the tissue over the bicipital groove.  We then performed a biceps tenodesis by sewing the biceps stump to the pectoralis major tendon with a #2 Ticron suture.       Next, adhesions were released up into the subacromial space and the superior rotator cuff was visualized and found to be intact.  The conjoint tendon was mobilized medially and protected throughout the case.  The axillary nerve was identified and also protected throughout the procedure.  At this time the subscapularis was visualized and a lesser tuberosity osteotomy was carried out.  An approximately 5mm osteotomy was created and the subscapularis was then released moving inferiorly with the inferior capsule around the glenoid neck with care being taken to avoid damage to surrounding structures.  Osteophytes were  then removed circumferentially from the humeral head.  The humerus was then positioned for preparation and retractors were placed circumferentially.  We then used the extramedullary guide to provisionally draw our humeral cut, matching their native version and neck shaft angle. An oscillating saw was then used to perform the proposed cut. Reamers and broaches were then sequentially placed until the 12mm broach had adequate press fit. A protective plate was then placed on the broach and we began exposing the glenoid.    The glenoid was circumferentially exposed in the usual fashion.  The subscapularis was mobilized with release of the rotator interval.  Any anterior adhesions as well as deep adhesions to the anterior capsule were incised inferiorly to approximately the apex of the glenoid.  Retractors were placed and the humerus was carefully retracted posteroinferiorly. Direct en face exposure of the glenoid was next obtained and at this point the glenoid was sized and prepared for the appropriate sized implant which in this patient was a size 46.  A trial glenoid was placed and found to have 100% circumferential bony support.  At this time the trial was removed and the peripheral pegs were packed with thrombin soaked packing.  Cement was mixed on the back table and then pressurized in the peripheral holes.  The implant was then opened and impacted into place with removal of excess cement at this time.  The cement was allowed to cure and the humerus was then re-exposed for final humeral preparation and closure.  The humerus was visualized and an appropriate sized head was chosen based on the patient's remaining humeral anatomy as well as the size of the resected humeral head.  For this patient, a size 50 centralized head was chosen to reconstruct their native humeral anatomy.  The trial head was placed and the shoulder was then reduced and taken through a range of motion.  We noted excellent posterior translation to  the lip of the glenoid without posterior instability.  The subscapularis was easily reduced to the lesser tuberosity osteotomy site with no untoward tension.  Satisfied with our head sizing, the humerus was then exposed again and all trial implants were removed.  The implantable stem was then opened and impacted into place achieving excellent purchase in the metaphyseal bone.  After a second trialing step, the final implantable head was then opened and assembled on the back table.  We then impacted the head into place, reduced the shoulder, and carried out final trialing of the construct.  Satisfied with our arthroplasty, closure was then carried out.    2 labral tape sutures had been passed for closure and fixation of the lesser tuberosity osteotomy.  2  holes were drilled from lateral to the bicipital groove and exiting medial to the osteotomy. Tapes were passed through the bone tunnels then passed in a cerclage fashion around the osteotomy itself and the osteotomy was anatomically reduced and the sutures were then secured.  We augmented this fixation with #2 Ethibond sutures placed in the lateral interval region as well as laterally with soft tissue fixation to complete our repair of the subscapularis and LTO.  The shoulder was again taken through a range of motion and the repair was found to be sound.  After copious irrigation we proceeded with final closure.  A layered closure was carried out utilizing 0 Ethibond sutures to re-approximate the delto-pectoral interval followed by 0 and 2-0 Vicryl sutures in a layered fashion for the skin and a sub-cuticular 3-0 prolene suture for final closure.  Steri-strips were placed and a sterile dressing was placed subsequently.  The patient was then placed in an ultra-sling device and brought into the supine position where he was successfully awakened from anesthetic, transferred to a gurney, and taken in stable condition to the recovery room.      There were no  intraoperative complications.    POSTOPERATIVE PLAN:    Anatomic Total Shoulder Arthroplasty protocol  NWB LUE in sling  Ok to be out of sling for pendulums, elbow, wrist ROM  ASA 81mg BID x 2wks for DVT ppx  F/u 10-14 days for wound check/suture removal          Jeremías English MD

## 2025-04-15 VITALS
HEIGHT: 66 IN | HEART RATE: 69 BPM | OXYGEN SATURATION: 95 % | WEIGHT: 211.63 LBS | SYSTOLIC BLOOD PRESSURE: 127 MMHG | DIASTOLIC BLOOD PRESSURE: 71 MMHG | RESPIRATION RATE: 15 BRPM | TEMPERATURE: 97 F | BODY MASS INDEX: 34.01 KG/M2

## 2025-04-22 ENCOUNTER — OFFICE VISIT (OUTPATIENT)
Dept: ORTHOPEDICS | Facility: CLINIC | Age: 60
End: 2025-04-22
Payer: COMMERCIAL

## 2025-04-22 VITALS — HEIGHT: 66 IN | WEIGHT: 211.63 LBS | BODY MASS INDEX: 34.01 KG/M2

## 2025-04-22 DIAGNOSIS — M17.12 PRIMARY OSTEOARTHRITIS OF LEFT KNEE: Primary | ICD-10-CM

## 2025-04-22 PROCEDURE — 99999 PR PBB SHADOW E&M-EST. PATIENT-LVL III: CPT | Mod: PBBFAC,,, | Performed by: ORTHOPAEDIC SURGERY

## 2025-04-22 PROCEDURE — 99499 UNLISTED E&M SERVICE: CPT | Mod: S$GLB,,, | Performed by: ORTHOPAEDIC SURGERY

## 2025-04-22 PROCEDURE — 20610 DRAIN/INJ JOINT/BURSA W/O US: CPT | Mod: 79,LT,S$GLB, | Performed by: ORTHOPAEDIC SURGERY

## 2025-04-22 NOTE — PROCEDURES
Large Joint Aspiration/Injection: L knee    Date/Time: 4/22/2025 7:50 AM    Performed by: Emre Purdy MD  Authorized by: Emre Purdy MD    Consent Done?:  Yes (Verbal)  Indications:  Pain and arthritis  Timeout: prior to procedure the correct patient, procedure, and site was verified    Prep: patient was prepped and draped in usual sterile fashion    Local anesthesia used?: No      Details:  Needle Size:  22 G  Ultrasonic Guidance for needle placement?: No    Approach:  Medial  Location:  Knee  Site:  L knee  Medications:  20 mg sodium hyaluronate (EUFLEXXA) 10 mg/mL(mw 2.4 -3.6 million)  Patient tolerance:  Patient tolerated the procedure well with no immediate complications

## 2025-04-29 ENCOUNTER — HOSPITAL ENCOUNTER (OUTPATIENT)
Dept: RADIOLOGY | Facility: HOSPITAL | Age: 60
Discharge: HOME | End: 2025-04-29
Attending: STUDENT IN AN ORGANIZED HEALTH CARE EDUCATION/TRAINING PROGRAM
Payer: COMMERCIAL

## 2025-04-29 ENCOUNTER — OFFICE VISIT (OUTPATIENT)
Dept: ORTHOPEDICS | Facility: CLINIC | Age: 60
End: 2025-04-29
Payer: COMMERCIAL

## 2025-04-29 ENCOUNTER — OFFICE VISIT (OUTPATIENT)
Dept: SPORTS MEDICINE | Facility: CLINIC | Age: 60
End: 2025-04-29
Payer: COMMERCIAL

## 2025-04-29 DIAGNOSIS — M17.12 PRIMARY OSTEOARTHRITIS OF LEFT KNEE: Primary | ICD-10-CM

## 2025-04-29 DIAGNOSIS — M19.012 GLENOHUMERAL ARTHRITIS, LEFT: ICD-10-CM

## 2025-04-29 DIAGNOSIS — Z96.612 STATUS POST TOTAL SHOULDER ARTHROPLASTY, LEFT: Primary | ICD-10-CM

## 2025-04-29 PROCEDURE — 99999 PR PBB SHADOW E&M-EST. PATIENT-LVL III: CPT | Mod: PBBFAC,,, | Performed by: ORTHOPAEDIC SURGERY

## 2025-04-29 PROCEDURE — 73030 X-RAY EXAM OF SHOULDER: CPT | Mod: 26,LT,, | Performed by: RADIOLOGY

## 2025-04-29 PROCEDURE — 73030 X-RAY EXAM OF SHOULDER: CPT | Mod: TC,LT

## 2025-04-29 PROCEDURE — 99999 PR PBB SHADOW E&M-EST. PATIENT-LVL III: CPT | Mod: PBBFAC,,, | Performed by: PHYSICIAN ASSISTANT

## 2025-04-29 PROCEDURE — 99024 POSTOP FOLLOW-UP VISIT: CPT | Mod: S$GLB,,, | Performed by: PHYSICIAN ASSISTANT

## 2025-04-29 RX ORDER — CELECOXIB 200 MG/1
200 CAPSULE ORAL 2 TIMES DAILY
Qty: 60 CAPSULE | Refills: 0 | Status: SHIPPED | OUTPATIENT
Start: 2025-04-29

## 2025-04-29 NOTE — PROGRESS NOTES
Emre Purdy MD  Orthopedic Surgeon   [address], [place] [address]     VISIT DATE: 4/29/2025      Name: Terrance Johnson  MRN: [id number]    PCP: Mildred Gayle NP  Insurance: Payor: [facility] / Plan: [facility] [place] PPO / Product Type: PPO /     Reason for Visit:  Visco supplement injection    Patient ID: Terrance Johnson is a 60 y.o. male.   Chief Complaint of Pain of the Left Knee        Patient Instructions     Encounter Diagnosis   Name Primary?    Primary osteoarthritis of left knee Yes       ASSESSMENT:  Osteoarthritis of the left knee    TREATMENT/PLAN:  Euflexxa 3 of 3 Left knee intra-articular injection  RTO 6 months, for repeat viscosupplementation if current treatment effective   The patient is to states he may decide to have his left knee replaced before the end of the year.  He has met all of his medical deductibles due to recent left shoulder surgery.           Emre Purdy M.D.  Orthopedic Surgeon  [facility]

## 2025-04-29 NOTE — PROCEDURES
Large Joint Aspiration/Injection: L knee    Date/Time: 4/29/2025 9:30 AM    Performed by: Emre Purdy MD  Authorized by: Emre Purdy MD    Consent Done?:  Yes (Verbal)  Indications:  Pain and arthritis  Timeout: prior to procedure the correct patient, procedure, and site was verified    Prep: patient was prepped and draped in usual sterile fashion    Local anesthesia used?: No      Details:  Needle Size:  22 G  Ultrasonic Guidance for needle placement?: No    Approach:  Lateral  Location:  Knee  Site:  L knee  Medications:  20 mg sodium hyaluronate (EUFLEXXA) 10 mg/mL(mw 2.4 -3.6 million)  Patient tolerance:  Patient tolerated the procedure well with no immediate complications

## 2025-04-29 NOTE — PATIENT INSTRUCTIONS
Encounter Diagnosis   Name Primary?    Primary osteoarthritis of left knee Yes       ASSESSMENT:  Osteoarthritis of the left knee    TREATMENT/PLAN:  Euflexxa 3 of 3 Left knee intra-articular injection  RTO 6 months, for repeat viscosupplementation if current treatment effective   The patient is to states he may decide to have his left knee replaced before the end of the year.  He has met all of his medical deductibles due to recent left shoulder surgery.

## 2025-05-27 ENCOUNTER — HOSPITAL ENCOUNTER (OUTPATIENT)
Dept: RADIOLOGY | Facility: HOSPITAL | Age: 60
Discharge: HOME | End: 2025-05-27
Attending: STUDENT IN AN ORGANIZED HEALTH CARE EDUCATION/TRAINING PROGRAM
Payer: COMMERCIAL

## 2025-05-27 ENCOUNTER — OFFICE VISIT (OUTPATIENT)
Dept: SPORTS MEDICINE | Facility: CLINIC | Age: 60
End: 2025-05-27
Payer: COMMERCIAL

## 2025-05-27 DIAGNOSIS — M19.012 GLENOHUMERAL ARTHRITIS, LEFT: ICD-10-CM

## 2025-05-27 DIAGNOSIS — Z96.612 STATUS POST TOTAL SHOULDER ARTHROPLASTY, LEFT: Primary | ICD-10-CM

## 2025-05-27 PROCEDURE — 1159F MED LIST DOCD IN RCRD: CPT | Mod: CPTII,S$GLB,, | Performed by: STUDENT IN AN ORGANIZED HEALTH CARE EDUCATION/TRAINING PROGRAM

## 2025-05-27 PROCEDURE — 1160F RVW MEDS BY RX/DR IN RCRD: CPT | Mod: CPTII,S$GLB,, | Performed by: STUDENT IN AN ORGANIZED HEALTH CARE EDUCATION/TRAINING PROGRAM

## 2025-05-27 PROCEDURE — 73030 X-RAY EXAM OF SHOULDER: CPT | Mod: 26,LT,, | Performed by: STUDENT IN AN ORGANIZED HEALTH CARE EDUCATION/TRAINING PROGRAM

## 2025-05-27 PROCEDURE — 99024 POSTOP FOLLOW-UP VISIT: CPT | Mod: S$GLB,,, | Performed by: STUDENT IN AN ORGANIZED HEALTH CARE EDUCATION/TRAINING PROGRAM

## 2025-05-27 PROCEDURE — 73030 X-RAY EXAM OF SHOULDER: CPT | Mod: TC,LT

## 2025-05-27 PROCEDURE — 99999 PR PBB SHADOW E&M-EST. PATIENT-LVL III: CPT | Mod: PBBFAC,,, | Performed by: STUDENT IN AN ORGANIZED HEALTH CARE EDUCATION/TRAINING PROGRAM

## 2025-05-27 NOTE — PROGRESS NOTES
Orthopaedics  Post-operative follow-up    Procedure Performed:  Left Total Shoulder Arthroplasty      Date of Surgery: 04/14/2025    Subjective: Terrance Johnson is now approximately 6 weeks out from his shoulder surgery.  He has been compliant with post-operative restrictions and has been progressing with PT at [facility].  His pain and function continue to improve.    Exam:  Incision sites healed, C/D/I  No swelling or bruising noted  Fluid ROM with no crepitus  Supine Active ROM: , , ER 30  Cuff strength intact on limited testing   Axillary nerve sensation and motor intact  Motor and sensory intact distally  Strong radial pulse, fingers warm and well perfused    Imaging:  Physician read:  Status post left total shoulder arthroplasty with appropriate position of implants, no evidence of failure or loosening.  Lesser tuberosity osteotomy site is intact.      Impression:  S/p left total shoulder arthroplasty, second post-operative visit - doing well    Plan:  Overall he is doing well and progressing with range of motion.  He has no pain is happy with his function currently.  Advance PT per protocol  Symptomatic treatment for pain / swelling  May advance activities as reviewed today: Discontinue sling, initiate progression of AAROM and AROM.   Instructed patient to call clinic if questions or concerns    Follow-up in 6 weeks at 3 months post-op (around 7/14/25)    Work status:  For weeks 0-6 from now:  1) Discontinue sling  2) Continue physical therapy  3) No lifting/pushing/pulling greater than 2 pounds  4) No overhead work  5) No repetitive motions        Jeremías English MD    I, Max Romero, acted as a scribe for Jeremías English MD for the duration of this office visit.

## 2025-07-15 ENCOUNTER — OFFICE VISIT (OUTPATIENT)
Dept: SPORTS MEDICINE | Facility: CLINIC | Age: 60
End: 2025-07-15
Payer: COMMERCIAL

## 2025-07-15 VITALS — HEIGHT: 66 IN | WEIGHT: 211.63 LBS | BODY MASS INDEX: 34.01 KG/M2

## 2025-07-15 DIAGNOSIS — Z96.612 STATUS POST TOTAL SHOULDER ARTHROPLASTY, LEFT: Primary | ICD-10-CM

## 2025-07-15 PROCEDURE — 99213 OFFICE O/P EST LOW 20 MIN: CPT | Mod: S$GLB,,, | Performed by: STUDENT IN AN ORGANIZED HEALTH CARE EDUCATION/TRAINING PROGRAM

## 2025-07-15 PROCEDURE — 3008F BODY MASS INDEX DOCD: CPT | Mod: CPTII,S$GLB,, | Performed by: STUDENT IN AN ORGANIZED HEALTH CARE EDUCATION/TRAINING PROGRAM

## 2025-07-15 PROCEDURE — 1160F RVW MEDS BY RX/DR IN RCRD: CPT | Mod: CPTII,S$GLB,, | Performed by: STUDENT IN AN ORGANIZED HEALTH CARE EDUCATION/TRAINING PROGRAM

## 2025-07-15 PROCEDURE — 99999 PR PBB SHADOW E&M-EST. PATIENT-LVL III: CPT | Mod: PBBFAC,,, | Performed by: STUDENT IN AN ORGANIZED HEALTH CARE EDUCATION/TRAINING PROGRAM

## 2025-07-15 PROCEDURE — 1159F MED LIST DOCD IN RCRD: CPT | Mod: CPTII,S$GLB,, | Performed by: STUDENT IN AN ORGANIZED HEALTH CARE EDUCATION/TRAINING PROGRAM

## 2025-07-15 NOTE — PROGRESS NOTES
Orthopaedics  Post-operative follow-up    Procedure Performed:  Left Total Shoulder Arthroplasty      Date of Surgery: 04/14/2025    Subjective: Terrance Johnson is now approximately 3 months out from his shoulder surgery.  He has been compliant with post-operative restrictions and has been progressing with PT at [facility].  His pain and function continue to improve.  He reports full range of motion with the shoulder.  He has only intermittent slight discomfort anteriorly but nothing concerning.    Exam:  Incision sites healed, C/D/I  No swelling or bruising noted  Fluid ROM with no crepitus  Upright Active ROM: , , ER 50, IR L3  Cuff strength intact  Axillary nerve sensation and motor intact  Motor and sensory intact distally  Strong radial pulse, fingers warm and well perfused    Imaging:  X-Ray Shoulder 2 or More Views Left  Narrative: EXAM: XR SHOULDER COMPLETE 2 OR MORE VIEWS LEFT    CLINICAL HISTORY:  [M19.012]-Primary osteoarthritis, left shoulder.    TECHNIQUE: Frontal, scapular Y, axial, and Grashey views of the left shoulder    COMPARISON: X-ray dated 04/29/2025    FINDINGS:  There is no acute fracture or dislocation. Humeral arthroplasty is unchanged with no new evidence of fracture or loosening. No aggressive lytic or blastic lesion. No osseous erosion or aggressive periosteal reaction seen. Mild acromioclavicular degenerative spurring noted.  Joint spaces otherwise appear relatively well preserved with normal alignment. Soft tissues are unremarkable.  Impression: Intact left humeral arthroplasty without evidence of hardware failure or complication.    Unchanged mild acromioclavicular degenerative changes.    Finalized on: 5/27/2025 10:01 AM By:  Jeremías Long MD  [facility]# [id number]      2025-05-27 10:03:[id number]     [facility]           Impression:  3 months s/p left total shoulder arthroplasty- doing well     Plan:  Overall he is doing very well.  He has excellent range of motion.   He is working on strength and endurance.  Advance PT per protocol   Symptomatic treatment for pain / swelling  May advance activities as reviewed today: Continue to progress strength and endurance of shoulder and periscapular musculature.   Instructed patient to call clinic if questions or concerns    Follow-up in 3 months at 6 months post-op with XR.             Jeremías English MD    I, Max Romero, acted as a scribe for Jeremías English MD for the duration of this office visit.

## (undated) DEVICE — RETRIEVER SUTURE HEWSON DISP

## (undated) DEVICE — GOWN SMARTGOWN LVL4 X-LONG XL

## (undated) DEVICE — TOWEL OR DISP STRL BLUE 4/PK

## (undated) DEVICE — COVER CAMERA OPERATING ROOM

## (undated) DEVICE — KIT TRIMANO

## (undated) DEVICE — ALCOHOL 70% ISOP RUBBING 4OZ

## (undated) DEVICE — DRAPE STERI INSTRUMENT 1018

## (undated) DEVICE — DRAPE INCISE IOBAN 2 23X33IN

## (undated) DEVICE — CAUTERY TIP 2 3/4

## (undated) DEVICE — DRAPE THREE-QTR REINF 53X77IN

## (undated) DEVICE — SPONGE COTTON TRAY 4X4IN

## (undated) DEVICE — GOWN POLY REINF BRTH SLV XL

## (undated) DEVICE — COVER LIGHT HANDLE 80/CA

## (undated) DEVICE — KIT TURNOVER

## (undated) DEVICE — HYDROGEN PEROXIDE HDX10 3% 4OZ

## (undated) DEVICE — SUT VICRYL PLUS 2-0 CT1 18

## (undated) DEVICE — BNDG COFLEX FOAM LF2 ST 6X5YD

## (undated) DEVICE — BLADE SAW SAGITTAL 11X.89X90MM

## (undated) DEVICE — KIT IRR SUCTION HND PIECE

## (undated) DEVICE — SLING ARM ULTRA III PAD MED

## (undated) DEVICE — COVER PROXIMA MAYO STAND

## (undated) DEVICE — GLOVE BIOGEL PI ORTHO PRO 7.5

## (undated) DEVICE — ELECTRODE REM PLYHSV RETURN 9

## (undated) DEVICE — SOL 9P NACL IRR PIC IL

## (undated) DEVICE — EVACUATOR PENCIL SMOKE NEPTUNE

## (undated) DEVICE — DRAPE U SPLIT SHEET 54X76IN

## (undated) DEVICE — DRAPE HIP PCH 112X137X89IN

## (undated) DEVICE — SPACESUIT TOGA T5 ZIPPER PEEL

## (undated) DEVICE — MANIFOLD 4 PORT

## (undated) DEVICE — GLOVE SURG BIOGEL LATEX SZ 7.5

## (undated) DEVICE — STRIP MEDI WND CLSR 1/2X4IN

## (undated) DEVICE — SUT X425H ETHIBOND 1-0

## (undated) DEVICE — SPONGE LAP 18X18 PREWASHED

## (undated) DEVICE — UNDERGLOVES BIOGEL PI SIZE 8

## (undated) DEVICE — SUT VICRYL PLUS 0 CT1 18IN

## (undated) DEVICE — COVER TABLE HVY DTY 60X90IN

## (undated) DEVICE — SUPPORT ULNA NERVE PROTECTOR

## (undated) DEVICE — APPLICATOR CHLORAPREP ORN 26ML

## (undated) DEVICE — SUT PROLENE 3-0 PS-2 BL 18IN

## (undated) DEVICE — DRAPE STERI U-SHAPED 47X51IN

## (undated) DEVICE — SUT LABRALTAPE 1.5X36 BL/WH

## (undated) DEVICE — SYR 50ML CATH TIP

## (undated) DEVICE — UNDERGLOVES BIOGEL PI SIZE 7.5

## (undated) DEVICE — STOCKINETTE TUBULAR 2PL 6 X 4

## (undated) DEVICE — PACK BASIC SETUP SC BR

## (undated) DEVICE — DRESSING WND HELIX ADH 5X6IN